# Patient Record
Sex: MALE | Race: WHITE | Employment: OTHER
[De-identification: names, ages, dates, MRNs, and addresses within clinical notes are randomized per-mention and may not be internally consistent; named-entity substitution may affect disease eponyms.]

---

## 2017-02-27 PROBLEM — E11.42 DIABETIC PERIPHERAL NEUROPATHY ASSOCIATED WITH TYPE 2 DIABETES MELLITUS (HCC): Status: ACTIVE | Noted: 2017-02-27

## 2017-02-27 PROBLEM — J30.1 SEASONAL ALLERGIC RHINITIS DUE TO POLLEN: Status: ACTIVE | Noted: 2017-02-27

## 2017-02-27 PROCEDURE — 82570 ASSAY OF URINE CREATININE: CPT | Performed by: INTERNAL MEDICINE

## 2017-02-27 PROCEDURE — 36415 COLL VENOUS BLD VENIPUNCTURE: CPT | Performed by: INTERNAL MEDICINE

## 2017-02-27 PROCEDURE — 81001 URINALYSIS AUTO W/SCOPE: CPT | Performed by: INTERNAL MEDICINE

## 2017-02-27 PROCEDURE — 82043 UR ALBUMIN QUANTITATIVE: CPT | Performed by: INTERNAL MEDICINE

## 2017-03-21 PROCEDURE — 81003 URINALYSIS AUTO W/O SCOPE: CPT | Performed by: INTERNAL MEDICINE

## 2017-03-21 PROCEDURE — 87086 URINE CULTURE/COLONY COUNT: CPT | Performed by: INTERNAL MEDICINE

## 2017-06-21 PROBLEM — R53.82 CHRONIC FATIGUE: Status: ACTIVE | Noted: 2017-06-21

## 2017-08-14 PROBLEM — G47.33 OSA (OBSTRUCTIVE SLEEP APNEA): Status: ACTIVE | Noted: 2017-08-14

## 2018-01-25 PROCEDURE — 81003 URINALYSIS AUTO W/O SCOPE: CPT | Performed by: INTERNAL MEDICINE

## 2018-02-01 PROBLEM — K25.0 BLEEDING ACUTE GASTRIC ULCER: Status: ACTIVE | Noted: 2018-02-01

## 2018-02-01 PROBLEM — Z98.890 S/P LAPAROTOMY: Status: ACTIVE | Noted: 2018-02-01

## 2018-02-08 ENCOUNTER — SURGERY (OUTPATIENT)
Age: 68
End: 2018-02-08

## 2018-02-08 ENCOUNTER — ANESTHESIA EVENT (OUTPATIENT)
Dept: SURGERY | Facility: HOSPITAL | Age: 68
End: 2018-02-08
Payer: MEDICARE

## 2018-02-08 ENCOUNTER — ANESTHESIA (OUTPATIENT)
Dept: SURGERY | Facility: HOSPITAL | Age: 68
End: 2018-02-08
Payer: MEDICARE

## 2018-02-08 ENCOUNTER — HOSPITAL ENCOUNTER (OUTPATIENT)
Facility: HOSPITAL | Age: 68
Setting detail: OBSERVATION
LOS: 1 days | Discharge: HOME HEALTH | End: 2018-02-09
Attending: SURGERY | Admitting: SURGERY
Payer: MEDICARE

## 2018-02-08 DIAGNOSIS — Z98.890 S/P LAPAROTOMY: ICD-10-CM

## 2018-02-08 DIAGNOSIS — T81.30XA WOUND DEHISCENCE: Primary | ICD-10-CM

## 2018-02-08 LAB
ALBUMIN SERPL-MCNC: 2.9 G/DL (ref 3.5–4.8)
ALP LIVER SERPL-CCNC: 81 U/L (ref 45–117)
ALT SERPL-CCNC: 15 U/L (ref 17–63)
AST SERPL-CCNC: 8 U/L (ref 15–41)
BASOPHILS # BLD AUTO: 0.1 X10(3) UL (ref 0–0.1)
BASOPHILS NFR BLD AUTO: 0.9 %
BILIRUB SERPL-MCNC: 0.3 MG/DL (ref 0.1–2)
BUN BLD-MCNC: 11 MG/DL (ref 8–20)
CALCIUM BLD-MCNC: 8.7 MG/DL (ref 8.3–10.3)
CHLORIDE: 107 MMOL/L (ref 101–111)
CO2: 27 MMOL/L (ref 22–32)
CREAT BLD-MCNC: 1 MG/DL (ref 0.7–1.3)
EOSINOPHIL # BLD AUTO: 0.04 X10(3) UL (ref 0–0.3)
EOSINOPHIL NFR BLD AUTO: 0.3 %
ERYTHROCYTE [DISTWIDTH] IN BLOOD BY AUTOMATED COUNT: 13.2 % (ref 11.5–16)
GLUCOSE BLD-MCNC: 138 MG/DL (ref 65–99)
GLUCOSE BLD-MCNC: 189 MG/DL (ref 70–99)
HCT VFR BLD AUTO: 45.3 % (ref 37–53)
HGB BLD-MCNC: 14.5 G/DL (ref 13–17)
IMMATURE GRANULOCYTE COUNT: 0.13 X10(3) UL (ref 0–1)
IMMATURE GRANULOCYTE RATIO %: 1.1 %
INR BLD: 1.04 (ref 0.89–1.11)
LYMPHOCYTES # BLD AUTO: 0.79 X10(3) UL (ref 0.9–4)
LYMPHOCYTES NFR BLD AUTO: 6.8 %
M PROTEIN MFR SERPL ELPH: 6.6 G/DL (ref 6.1–8.3)
MCH RBC QN AUTO: 29.7 PG (ref 27–33.2)
MCHC RBC AUTO-ENTMCNC: 32 G/DL (ref 31–37)
MCV RBC AUTO: 92.6 FL (ref 80–99)
MONOCYTES # BLD AUTO: 0.64 X10(3) UL (ref 0.1–1)
MONOCYTES NFR BLD AUTO: 5.5 %
NEUTROPHIL ABS PRELIM: 9.96 X10 (3) UL (ref 1.3–6.7)
NEUTROPHILS # BLD AUTO: 9.96 X10(3) UL (ref 1.3–6.7)
NEUTROPHILS NFR BLD AUTO: 85.4 %
PLATELET # BLD AUTO: 355 10(3)UL (ref 150–450)
POTASSIUM SERPL-SCNC: 4.2 MMOL/L (ref 3.6–5.1)
PSA SERPL DL<=0.01 NG/ML-MCNC: 13.6 SECONDS (ref 12–14.3)
RBC # BLD AUTO: 4.89 X10(6)UL (ref 3.8–5.8)
RED CELL DISTRIBUTION WIDTH-SD: 44.8 FL (ref 35.1–46.3)
SODIUM SERPL-SCNC: 140 MMOL/L (ref 136–144)
WBC # BLD AUTO: 11.7 X10(3) UL (ref 4–13)

## 2018-02-08 PROCEDURE — 85025 COMPLETE CBC W/AUTO DIFF WBC: CPT | Performed by: HOSPITALIST

## 2018-02-08 PROCEDURE — 94660 CPAP INITIATION&MGMT: CPT

## 2018-02-08 PROCEDURE — 0JQ80ZZ REPAIR ABDOMEN SUBCUTANEOUS TISSUE AND FASCIA, OPEN APPROACH: ICD-10-PCS | Performed by: SURGERY

## 2018-02-08 PROCEDURE — 82962 GLUCOSE BLOOD TEST: CPT

## 2018-02-08 PROCEDURE — 85610 PROTHROMBIN TIME: CPT | Performed by: HOSPITALIST

## 2018-02-08 PROCEDURE — 80053 COMPREHEN METABOLIC PANEL: CPT | Performed by: HOSPITALIST

## 2018-02-08 RX ORDER — HYDROMORPHONE HYDROCHLORIDE 1 MG/ML
0.4 INJECTION, SOLUTION INTRAMUSCULAR; INTRAVENOUS; SUBCUTANEOUS EVERY 2 HOUR PRN
Status: DISCONTINUED | OUTPATIENT
Start: 2018-02-08 | End: 2018-02-09

## 2018-02-08 RX ORDER — MYCOPHENOLATE MOFETIL 250 MG/1
500 CAPSULE ORAL 2 TIMES DAILY
Status: DISCONTINUED | OUTPATIENT
Start: 2018-02-09 | End: 2018-02-08

## 2018-02-08 RX ORDER — SUCRALFATE 1 G/1
1 TABLET ORAL
Status: DISCONTINUED | OUTPATIENT
Start: 2018-02-08 | End: 2018-02-09

## 2018-02-08 RX ORDER — PANTOPRAZOLE SODIUM 40 MG/1
40 TABLET, DELAYED RELEASE ORAL
Status: DISCONTINUED | OUTPATIENT
Start: 2018-02-09 | End: 2018-02-09

## 2018-02-08 RX ORDER — HYDROCODONE BITARTRATE AND ACETAMINOPHEN 5; 325 MG/1; MG/1
1 TABLET ORAL EVERY 6 HOURS PRN
Status: DISCONTINUED | OUTPATIENT
Start: 2018-02-08 | End: 2018-02-08

## 2018-02-08 RX ORDER — METOCLOPRAMIDE HYDROCHLORIDE 5 MG/ML
10 INJECTION INTRAMUSCULAR; INTRAVENOUS EVERY 6 HOURS PRN
Status: DISCONTINUED | OUTPATIENT
Start: 2018-02-08 | End: 2018-02-09

## 2018-02-08 RX ORDER — ATORVASTATIN CALCIUM 10 MG/1
10 TABLET, FILM COATED ORAL NIGHTLY
Status: DISCONTINUED | OUTPATIENT
Start: 2018-02-08 | End: 2018-02-09

## 2018-02-08 RX ORDER — HEPARIN SODIUM 5000 [USP'U]/ML
5000 INJECTION, SOLUTION INTRAVENOUS; SUBCUTANEOUS EVERY 12 HOURS SCHEDULED
Status: DISCONTINUED | OUTPATIENT
Start: 2018-02-09 | End: 2018-02-09

## 2018-02-08 RX ORDER — MEPERIDINE HYDROCHLORIDE 25 MG/ML
12.5 INJECTION INTRAMUSCULAR; INTRAVENOUS; SUBCUTANEOUS AS NEEDED
Status: DISCONTINUED | OUTPATIENT
Start: 2018-02-08 | End: 2018-02-08 | Stop reason: HOSPADM

## 2018-02-08 RX ORDER — MORPHINE SULFATE 2 MG/ML
2 INJECTION, SOLUTION INTRAMUSCULAR; INTRAVENOUS EVERY 2 HOUR PRN
Status: DISCONTINUED | OUTPATIENT
Start: 2018-02-08 | End: 2018-02-09

## 2018-02-08 RX ORDER — HYDROCODONE BITARTRATE AND ACETAMINOPHEN 5; 325 MG/1; MG/1
2 TABLET ORAL EVERY 4 HOURS PRN
Status: DISCONTINUED | OUTPATIENT
Start: 2018-02-08 | End: 2018-02-09

## 2018-02-08 RX ORDER — MORPHINE SULFATE 2 MG/ML
1 INJECTION, SOLUTION INTRAMUSCULAR; INTRAVENOUS EVERY 2 HOUR PRN
Status: DISCONTINUED | OUTPATIENT
Start: 2018-02-08 | End: 2018-02-09

## 2018-02-08 RX ORDER — SODIUM CHLORIDE, SODIUM LACTATE, POTASSIUM CHLORIDE, CALCIUM CHLORIDE 600; 310; 30; 20 MG/100ML; MG/100ML; MG/100ML; MG/100ML
INJECTION, SOLUTION INTRAVENOUS CONTINUOUS
Status: DISCONTINUED | OUTPATIENT
Start: 2018-02-08 | End: 2018-02-08 | Stop reason: HOSPADM

## 2018-02-08 RX ORDER — HYDROCODONE BITARTRATE AND ACETAMINOPHEN 5; 325 MG/1; MG/1
1 TABLET ORAL EVERY 4 HOURS PRN
Status: DISCONTINUED | OUTPATIENT
Start: 2018-02-08 | End: 2018-02-09

## 2018-02-08 RX ORDER — MORPHINE SULFATE 2 MG/ML
4 INJECTION, SOLUTION INTRAMUSCULAR; INTRAVENOUS EVERY 2 HOUR PRN
Status: DISCONTINUED | OUTPATIENT
Start: 2018-02-08 | End: 2018-02-09

## 2018-02-08 RX ORDER — SODIUM CHLORIDE 9 MG/ML
INJECTION, SOLUTION INTRAVENOUS CONTINUOUS
Status: DISCONTINUED | OUTPATIENT
Start: 2018-02-08 | End: 2018-02-09

## 2018-02-08 RX ORDER — HYDROMORPHONE HYDROCHLORIDE 1 MG/ML
1.2 INJECTION, SOLUTION INTRAMUSCULAR; INTRAVENOUS; SUBCUTANEOUS EVERY 2 HOUR PRN
Status: DISCONTINUED | OUTPATIENT
Start: 2018-02-08 | End: 2018-02-09

## 2018-02-08 RX ORDER — DEXTROSE MONOHYDRATE 25 G/50ML
50 INJECTION, SOLUTION INTRAVENOUS
Status: DISCONTINUED | OUTPATIENT
Start: 2018-02-08 | End: 2018-02-08 | Stop reason: HOSPADM

## 2018-02-08 RX ORDER — ACETAMINOPHEN 160 MG
2000 TABLET,DISINTEGRATING ORAL
Status: DISCONTINUED | OUTPATIENT
Start: 2018-02-09 | End: 2018-02-09

## 2018-02-08 RX ORDER — ONDANSETRON 2 MG/ML
4 INJECTION INTRAMUSCULAR; INTRAVENOUS AS NEEDED
Status: DISCONTINUED | OUTPATIENT
Start: 2018-02-08 | End: 2018-02-08 | Stop reason: HOSPADM

## 2018-02-08 RX ORDER — PYRIDOSTIGMINE BROMIDE 60 MG/1
60 TABLET ORAL DAILY
Status: DISCONTINUED | OUTPATIENT
Start: 2018-02-09 | End: 2018-02-09

## 2018-02-08 RX ORDER — MORPHINE SULFATE 2 MG/ML
6 INJECTION, SOLUTION INTRAMUSCULAR; INTRAVENOUS EVERY 2 HOUR PRN
Status: DISCONTINUED | OUTPATIENT
Start: 2018-02-08 | End: 2018-02-09

## 2018-02-08 RX ORDER — MYCOPHENOLATE MOFETIL 250 MG/1
500 CAPSULE ORAL 2 TIMES DAILY
Status: DISCONTINUED | OUTPATIENT
Start: 2018-02-08 | End: 2018-02-09

## 2018-02-08 RX ORDER — PYRIDOSTIGMINE BROMIDE 60 MG/1
120 TABLET ORAL 2 TIMES DAILY
Status: DISCONTINUED | OUTPATIENT
Start: 2018-02-08 | End: 2018-02-09

## 2018-02-08 RX ORDER — NALOXONE HYDROCHLORIDE 0.4 MG/ML
80 INJECTION, SOLUTION INTRAMUSCULAR; INTRAVENOUS; SUBCUTANEOUS AS NEEDED
Status: DISCONTINUED | OUTPATIENT
Start: 2018-02-08 | End: 2018-02-08 | Stop reason: HOSPADM

## 2018-02-08 RX ORDER — ONDANSETRON 2 MG/ML
4 INJECTION INTRAMUSCULAR; INTRAVENOUS EVERY 6 HOURS PRN
Status: DISCONTINUED | OUTPATIENT
Start: 2018-02-08 | End: 2018-02-08

## 2018-02-08 RX ORDER — DEXTROSE MONOHYDRATE 25 G/50ML
50 INJECTION, SOLUTION INTRAVENOUS
Status: DISCONTINUED | OUTPATIENT
Start: 2018-02-08 | End: 2018-02-09

## 2018-02-08 RX ORDER — METOCLOPRAMIDE HYDROCHLORIDE 5 MG/ML
10 INJECTION INTRAMUSCULAR; INTRAVENOUS AS NEEDED
Status: DISCONTINUED | OUTPATIENT
Start: 2018-02-08 | End: 2018-02-08 | Stop reason: HOSPADM

## 2018-02-08 RX ORDER — CEFOXITIN 1 G/1
INJECTION, POWDER, FOR SOLUTION INTRAVENOUS
Status: DISCONTINUED | OUTPATIENT
Start: 2018-02-08 | End: 2018-02-08 | Stop reason: HOSPADM

## 2018-02-08 RX ORDER — MORPHINE SULFATE 2 MG/ML
2 INJECTION, SOLUTION INTRAMUSCULAR; INTRAVENOUS EVERY 2 HOUR PRN
Status: DISCONTINUED | OUTPATIENT
Start: 2018-02-08 | End: 2018-02-08

## 2018-02-08 RX ORDER — HYDROMORPHONE HYDROCHLORIDE 1 MG/ML
0.8 INJECTION, SOLUTION INTRAMUSCULAR; INTRAVENOUS; SUBCUTANEOUS EVERY 2 HOUR PRN
Status: DISCONTINUED | OUTPATIENT
Start: 2018-02-08 | End: 2018-02-09

## 2018-02-08 RX ORDER — MIDAZOLAM HYDROCHLORIDE 1 MG/ML
1 INJECTION INTRAMUSCULAR; INTRAVENOUS EVERY 5 MIN PRN
Status: DISCONTINUED | OUTPATIENT
Start: 2018-02-08 | End: 2018-02-08 | Stop reason: HOSPADM

## 2018-02-08 RX ORDER — ONDANSETRON 2 MG/ML
4 INJECTION INTRAMUSCULAR; INTRAVENOUS EVERY 6 HOURS PRN
Status: DISCONTINUED | OUTPATIENT
Start: 2018-02-08 | End: 2018-02-09

## 2018-02-08 RX ORDER — MORPHINE SULFATE 2 MG/ML
4 INJECTION, SOLUTION INTRAMUSCULAR; INTRAVENOUS EVERY 2 HOUR PRN
Status: DISCONTINUED | OUTPATIENT
Start: 2018-02-08 | End: 2018-02-08

## 2018-02-08 RX ORDER — PYRIDOSTIGMINE BROMIDE 60 MG/1
60 TABLET ORAL SEE ADMIN INSTRUCTIONS
Status: DISCONTINUED | OUTPATIENT
Start: 2018-02-08 | End: 2018-02-08 | Stop reason: CLARIF

## 2018-02-08 NOTE — ANESTHESIA PREPROCEDURE EVALUATION
PRE-OP EVALUATION    Patient Name: Elise Beltrán    Pre-op Diagnosis: Dehiscence of closure of fascia, superficial or muscular [T81.32XA]    Procedure(s):  REPAIR OF A FASCIA  DEHISCENSE    Surgeon(s) and Role:     Aye Raza MD - Primary    Pre-op (+) sleep apnea       Neuro/Psych  Comment: Myasthenia gravis                                  Past Surgical History:  No date: COLONOSCOPY      Comment: 2007  9/15/2016: COLONOSCOPY N/A      Comment: Procedure: COLONOSCOPY;  Surgeon: Jose Manuel Johnson Scott Coffey MD;  Location: [address] [place] [address]  11[facility]21[facility]      Comment: Procedure: LUMBAR EPIDURAL;  Surgeon: Josselin Mccormick MD;  Location: [address] [place] [address]  No date: OTHER SURGICAL HISTORY

## 2018-02-09 VITALS
SYSTOLIC BLOOD PRESSURE: 115 MMHG | HEART RATE: 58 BPM | DIASTOLIC BLOOD PRESSURE: 74 MMHG | TEMPERATURE: 99 F | RESPIRATION RATE: 16 BRPM | OXYGEN SATURATION: 94 %

## 2018-02-09 LAB
ALBUMIN SERPL-MCNC: 2.5 G/DL (ref 3.5–4.8)
ALP LIVER SERPL-CCNC: 66 U/L (ref 45–117)
ALT SERPL-CCNC: 12 U/L (ref 17–63)
AST SERPL-CCNC: 7 U/L (ref 15–41)
BASOPHILS # BLD AUTO: 0.12 X10(3) UL (ref 0–0.1)
BASOPHILS NFR BLD AUTO: 1.2 %
BILIRUB SERPL-MCNC: 0.4 MG/DL (ref 0.1–2)
BUN BLD-MCNC: 10 MG/DL (ref 8–20)
CALCIUM BLD-MCNC: 8.4 MG/DL (ref 8.3–10.3)
CHLORIDE: 111 MMOL/L (ref 101–111)
CO2: 26 MMOL/L (ref 22–32)
CREAT BLD-MCNC: 0.84 MG/DL (ref 0.7–1.3)
EOSINOPHIL # BLD AUTO: 0.21 X10(3) UL (ref 0–0.3)
EOSINOPHIL NFR BLD AUTO: 2.1 %
ERYTHROCYTE [DISTWIDTH] IN BLOOD BY AUTOMATED COUNT: 13.2 % (ref 11.5–16)
EST. AVERAGE GLUCOSE BLD GHB EST-MCNC: 148 MG/DL (ref 68–126)
GLUCOSE BLD-MCNC: 91 MG/DL (ref 70–99)
GLUCOSE BLD-MCNC: 94 MG/DL (ref 65–99)
GLUCOSE BLD-MCNC: 99 MG/DL (ref 65–99)
HBA1C MFR BLD HPLC: 6.8 % (ref ?–5.7)
HCT VFR BLD AUTO: 42.5 % (ref 37–53)
HGB BLD-MCNC: 13.5 G/DL (ref 13–17)
IMMATURE GRANULOCYTE COUNT: 0.13 X10(3) UL (ref 0–1)
IMMATURE GRANULOCYTE RATIO %: 1.3 %
LYMPHOCYTES # BLD AUTO: 1.31 X10(3) UL (ref 0.9–4)
LYMPHOCYTES NFR BLD AUTO: 13.2 %
M PROTEIN MFR SERPL ELPH: 5.6 G/DL (ref 6.1–8.3)
MCH RBC QN AUTO: 29.7 PG (ref 27–33.2)
MCHC RBC AUTO-ENTMCNC: 31.8 G/DL (ref 31–37)
MCV RBC AUTO: 93.4 FL (ref 80–99)
MONOCYTES # BLD AUTO: 0.89 X10(3) UL (ref 0.1–1)
MONOCYTES NFR BLD AUTO: 8.9 %
NEUTROPHIL ABS PRELIM: 7.29 X10 (3) UL (ref 1.3–6.7)
NEUTROPHILS # BLD AUTO: 7.29 X10(3) UL (ref 1.3–6.7)
NEUTROPHILS NFR BLD AUTO: 73.3 %
PLATELET # BLD AUTO: 298 10(3)UL (ref 150–450)
POTASSIUM SERPL-SCNC: 3.8 MMOL/L (ref 3.6–5.1)
RBC # BLD AUTO: 4.55 X10(6)UL (ref 3.8–5.8)
RED CELL DISTRIBUTION WIDTH-SD: 45.1 FL (ref 35.1–46.3)
SODIUM SERPL-SCNC: 142 MMOL/L (ref 136–144)
WBC # BLD AUTO: 10 X10(3) UL (ref 4–13)

## 2018-02-09 PROCEDURE — 96372 THER/PROPH/DIAG INJ SC/IM: CPT

## 2018-02-09 PROCEDURE — 99211 OFF/OP EST MAY X REQ PHY/QHP: CPT

## 2018-02-09 PROCEDURE — 85025 COMPLETE CBC W/AUTO DIFF WBC: CPT | Performed by: HOSPITALIST

## 2018-02-09 PROCEDURE — 82962 GLUCOSE BLOOD TEST: CPT

## 2018-02-09 PROCEDURE — 83036 HEMOGLOBIN GLYCOSYLATED A1C: CPT | Performed by: HOSPITALIST

## 2018-02-09 PROCEDURE — 80053 COMPREHEN METABOLIC PANEL: CPT | Performed by: HOSPITALIST

## 2018-02-09 RX ORDER — HEPARIN SODIUM 5000 [USP'U]/ML
5000 INJECTION, SOLUTION INTRAVENOUS; SUBCUTANEOUS 2 TIMES DAILY
Status: DISCONTINUED | OUTPATIENT
Start: 2018-02-09 | End: 2018-02-09

## 2018-02-09 RX ORDER — POTASSIUM CHLORIDE 20 MEQ/1
40 TABLET, EXTENDED RELEASE ORAL ONCE
Status: COMPLETED | OUTPATIENT
Start: 2018-02-09 | End: 2018-02-09

## 2018-02-09 NOTE — OPERATIVE REPORT
[facility]    PATIENT'S NAME: Kami Hensley   ATTENDING PHYSICIAN: Scar Hammonds M.D. OPERATING PHYSICIAN: Chuyita Wiggins M.D.    PATIENT ACCOUNT#:   [de-identified]    LOCATION:  [address] [place] [address]  MEDICAL RECORD #:   [id number]       DATE OF BIRTH:  [birth year]

## 2018-02-09 NOTE — PROGRESS NOTES
Patient arrived from PACU in stable condition. Admission data completed. Patient resting comfortably in bed at this time. Family at bedside. Will continue to monitor.

## 2018-02-09 NOTE — ANESTHESIA POSTPROCEDURE EVALUATION
[address] Patient Status:  Inpatient   Age/Gender 69year old male MRN [id number]   [facility] SURGERY Attending Nayan Ray MD   Hosp Day # 0 PCP Neda Mckeon MD       Anesthesia Post-op Note    Procedure(s):

## 2018-02-09 NOTE — HOME CARE LIAISON
MET WITH PTNT TO DISCUSS HOME HEALTH SERVICES AND COVERAGE CRITERIA. PTNT AGREEABLE TO Emery Camejo. PTNT GIVEN RESIDENTIAL BROCHURE. RESIDENTIAL WITH PROVIDE SN/PT ON DISCHARGE.     Thank you for this referral,   Ashley Mccarthy

## 2018-02-09 NOTE — BRIEF OP NOTE
Pre-Operative Diagnosis: Dehiscence of closure of fascia, superficial or muscular [T81.32XA]     Post-Operative Diagnosis: Dehiscence of wound     Procedure Performed:   Procedure(s):  EXPLORATION OF WOUND    Surgeon(s) and Role:     Corinne Thurman MD -

## 2018-02-09 NOTE — WOUND PROGRESS NOTE
[facility]  Inpatient Wound Care Contact Note    Nguyễn Tyalor Patient Status:  Inpatient    1950 MRN [id number]   [facility] 3NW-A Attending Kami Siddiqi MD   Hosp Day # 1 PCP Jaden Yan MD     Wound Care service and

## 2018-02-09 NOTE — PLAN OF CARE
Problem: Patient/Family Goals  Goal: Patient/Family Short Term Goal  Patient's Short Term Goal: discharge    Interventions:   - dr duff here to see pt today.   Wound care team unavailable today d/t bad weather.    - See additional Care Plan goals for speci toileting schedule   Outcome: Progressing  Up ambulating in room with steady gait.   Has good safety awareness    Problem: SKIN/TISSUE INTEGRITY - ADULT  Goal: Incision(s), wounds(s) or drain site(s) healing without S/S of infection  INTERVENTIONS:  - Asses

## 2018-02-09 NOTE — CM/SW NOTE
Patient for discharge home today, wound care unable to eval for wound vac d/t weather. Will cont bid w to d dressings till seen in outpt wound clinic, phone number provided on avs. [facility] to [facility]; pending acceptance.  reviewed with patient a

## 2018-02-09 NOTE — H&P
MELISA Hospitalist H&P       CC: No chief complaint on file.        PCP: Coni German MD    History of Present Illness:  Mr. Ayden Castanon is a 70 yo male with PMH of myasthenia gravis, DM type 2 with history of surgery 3 weeks ago for a gastric ulcer who pres AHI 14 RDI 14 REM AHI 0 Supine AHI 44 non-supine AHI 1 Sao2 Zain 88% /APAP-6-16cwp/Prism   • Osteoarthrosis, unspecified whether generalized or localized, unspecified site    • Other and unspecified hyperlipidemia    • Type II or unspecified type diabetes EPIDURAL                STEROID INJECTION;  Surgeon: Donita Grigsby MD;  Location: [address] [place] [address]  No date: LAPAROSCOPY, SURGICAL PROSTATECTOMY, RETROPUBI*  10/24/2013: DANYA BY LONNY NUNN [facility] 5+ YR      Comment: Procedure: 10   VITAMIN D 2000 UNIT OR TABS 1 TABLET DAILY Disp:  Rfl:    ACCU-CHEK SOFT TOUCH LANCETS MISC tests once daily (Patient taking differently: tests once daily with 1000 IU of vitamin D) Disp: 3 months Rfl: 3   Calcium Carbonate (CALCIUM 600 OR) Take 1 tab 27.0  26.0   BUN  11  10   CREATSERUM  1.00  0.84   GLU  189*  91   CA  8.7  8.4       Recent Labs   Lab  02/08/18   2244  02/09/18   0537   ALT  15*  12*   AST  8*  7*   ALB  2.9*  2.5*       No results for input(s): TROP in the last 72 hours.     Radiolog

## 2018-02-09 NOTE — RESPIRATORY THERAPY NOTE
GURWINDER Equipment Usage Summary :            Set Mode :AUTO CPAP W FLEX          Usage in Hours:5;00          90% Pressure (EPAP) : 11.3           90% Insp Pressure (IPAP);           AHI : 11.4          Supplemental Oxygen :      LPM

## 2018-02-09 NOTE — PROGRESS NOTES
Spoke with dr duff. Informed md that wound care team not able to see pt today. md states to have pt do wet to dry dressing changes bid & f/u wound clinic Monday. md states ok to d/c home with hh.  Will page dr Erma Coronado to update on poc.     Dr Grisel Horvath

## 2018-02-09 NOTE — PROGRESS NOTES
Dr duff pageken to update on wound care team unable to see pt today d/t team is unavailable. Awaiting return call.

## 2018-02-09 NOTE — CM/SW NOTE
Patient failed inpatient criteria. Second level of review completed and supports observation.  UR committee in agreement.  UR ELLIOTT Daigle discussed with Dr. Sandra Brewer who approves observation status.  Observation order written, MOON letter given to the patient

## 2018-02-09 NOTE — PAYOR COMM NOTE
--------------  Order Requisition   Admit to inpatient Once (Order #[id number]) on 2/8/18        STATUS CHANGED TO OBS ON 2/9 AS A MEDICARE [id number]  Order Requisition   Place in observation Once (Order #[id number]) on 2/9/18        ADMISSION REVIEW     Payor: ILIANA Esophageal reflux    • Hiatal hernia    • High cholesterol    • Malignant neoplasm of prostate 1/14/2009    S/P robotic radical prostatectomy 11/07   • Myasthenia gravis  8/28/2010   • GURWINDER  3/5/17-PSG   • Osteoarthrosis    • Other and unspecified hyperlipi PROCEDURE PERFORMED:  Wound exploration under anesthesia.      The patient had an open wound in the mid abdomen from a previous midline surgical procedure 3-1/2 weeks ago.   This was evaluated in the office by my partner and determined to have a possible Given 120 mg Oral Nadeem Villalta RN    2/8/2018 2254 Given 120 mg Oral Kiana Mcdonald RN      0.9%  NaCl infusion     Date Action Dose Route User    2/9/2018 0923 New Bag (none) Intravenous [place]    2/8/2018 2040 Restarted (none) Intra

## 2018-02-09 NOTE — INTERVAL H&P NOTE
Pre-op Diagnosis: Dehiscence of closure of fascia, superficial or muscular [T81.32XA]    The above referenced H&P was reviewed by Ghassan Patterson MD on 2/8/2018, the patient was examined and no significant changes have occurred in the patient's condition since

## 2018-02-09 NOTE — DISCHARGE SUMMARY
General Medicine Discharge Summary     Patient ID:  Ramone Cao  69year old  5/19/1950    Admit date: 2/8/2018    Discharge date and time: 2/9/18    Attending Physician: Enio Vázquez MD     Primary Care Physician: Maria D Mcgee MD     Reason fo DAILY    HYDROcodone-acetaminophen 5-325 MG Oral Tab  Take 1 tablet by mouth every 6 (six) hours as needed for Pain.     prednisoLONE 5 MG Oral Tab  3 tabs daily    Pyridostigmine Bromide 60 MG Oral Tab  TAKE 2 TABLETS BY MOUTH EVERY MORNING AND 1 tab in af

## 2018-02-09 NOTE — H&P (VIEW-ONLY)
Ramone Cao is a 69year old male. Patient presents with:  New Patient: wound hasn't closed       Janice German    HPI:  Patient presents for an assessment of his abdominal wound.  He underwent emergency laparotomy 3-1/2 weeks ago in [place] or localized, unspecified site    • Other and unspecified hyperlipidemia    • Type II or unspecified type diabetes mellitus without mention of complication, not stated as uncontrolled     BORDERLINE   • Visual impairment     glasses   • Vitiligo 5/27/2010 SURGICAL PROSTATECTOMY, RETROPUBI*  10/24/2013: DANYA BY  [facility] 5+ YR      Comment: Procedure: LUMBAR EPIDURAL;  Surgeon: Chris Nicole MD;  Location: [address] [place] [address]  11/21/2013: DANYA BY  [facility] 5+ Y Tab Take 1 tablet (40 mg total) by mouth nightly. Disp: 90 tablet Rfl: 3   Wheat Dextrin (BENEFIBER DRINK MIX OR) Take by mouth as needed.    Disp:  Rfl:    ACCU-CHEK COMPACT In Vitro Strip CHECK FASTING BLOOD SUGAR EVERY DAY  Disp: 200 each Rfl: 10   VITAM synovitis upper or lower extremity, no spinal tenderness  EXTREMITIES: no cyanosis, clubbing or edema, peripheral pulses intact  NEUROLOGIC: intact; no sensorimotor deficit; reflexes normal  PSYCHIATRIC: alert and oriented x 3; affect appropriate      IMPR

## 2018-02-10 NOTE — PROGRESS NOTES
Pt d/c home. D/c instructions given to pt & pt's wife, including demonstration/instructions on dressing changes bid, diet, hydration, activity, home meds & f/u care.   Pt verbalized understanding of all instructions, including calling wound care clinic on

## 2018-02-13 ENCOUNTER — OFFICE VISIT (OUTPATIENT)
Dept: WOUND CARE | Facility: HOSPITAL | Age: 68
End: 2018-02-13
Attending: NURSE PRACTITIONER
Payer: MEDICARE

## 2018-02-13 DIAGNOSIS — E11.42 DIABETIC POLYNEUROPATHY (HCC): ICD-10-CM

## 2018-02-13 DIAGNOSIS — S31.102S UNSPECIFIED OPEN WOUND OF ABDOMINAL WALL, EPIGASTRIC REGION WITHOUT PENETRATION INTO PERITONEAL CAVITY, SEQUELA: Primary | ICD-10-CM

## 2018-02-13 DIAGNOSIS — T81.31XA DISRUPTION OF EXTERNAL OPERATION (SURGICAL) WOUND, NOT ELSEWHERE CLASSIFIED, INITIAL ENCOUNTER: ICD-10-CM

## 2018-02-13 PROCEDURE — 97597 DBRDMT OPN WND 1ST 20 CM/<: CPT

## 2018-02-13 PROCEDURE — 97607 NEG PRS WND THR NDME<=50SQCM: CPT

## 2018-02-13 PROCEDURE — 99215 OFFICE O/P EST HI 40 MIN: CPT

## 2018-02-14 NOTE — PROGRESS NOTES
Note Details  Patient Name: Ravi Ferrari Date: 2/13/2018   Patient Number: [id number] Physician / Sydni Matute: Bjorn Pérez   Patient YOB: 1950 Facility: [facility]    Chief Complaint  This information was obtained from t This information was obtained from the patient, chart  Patient has a medical history of:  visual impairment  Type II Diabetes  Hyperlipidemia  Osteoarthrosis  Sleep apnea  Myasthenia gravis (2010)  Prostate cancer  Onchomycosis  Diverticulitis  Hiatal Pablito pravastatin 40 mg tablet oral tablet oral  Carafate 1 gram tablet oral tablet oral  Mestinon 60 mg tablet oral tablet oral  pyridostigmine bromide 60 mg tablet oral tablet oral  prednisolone 5 mg tablet oral tablet oral  CellCept 500 mg tablet oral tablet Wound #1 Distal Abdomen is an acute Full Thickness Surgical Wound and has received a status of Not Healed. Subsequent wound encounter measurements are 3.8cm length x 2.9cm width x 0.6cm depth, with an area of 11.02 sq cm and a volume of 6.612 cubic cm.  No (Encounter Diagnosis) S31.102S - Unspecified open wound of abdominal wall, epigastric region without penetration into peritoneal cavity, sequela  (Encounter Diagnosis) T81.31XA - Disruption of external operation (surgical) wound, not elsewhere classified, Initial Anesthetic Order: Apply lidocaine to wound bed on all future wound center visits during preparation for physician exam if wound bed contains fibrin or eschar.   4% Topical Lidocaine  Dermaplast Spray    Wound Cleansing & Dressings  May shower with p

## 2018-02-16 ENCOUNTER — OFFICE VISIT (OUTPATIENT)
Dept: WOUND CARE | Facility: HOSPITAL | Age: 68
End: 2018-02-16
Attending: NURSE PRACTITIONER
Payer: MEDICARE

## 2018-02-16 DIAGNOSIS — T81.31XA DISRUPTION OF EXTERNAL OPERATION (SURGICAL) WOUND, NOT ELSEWHERE CLASSIFIED, INITIAL ENCOUNTER: ICD-10-CM

## 2018-02-16 DIAGNOSIS — S31.102S UNSPECIFIED OPEN WOUND OF ABDOMINAL WALL, EPIGASTRIC REGION WITHOUT PENETRATION INTO PERITONEAL CAVITY, SEQUELA: Primary | ICD-10-CM

## 2018-02-16 PROCEDURE — 97607 NEG PRS WND THR NDME<=50SQCM: CPT

## 2018-02-20 ENCOUNTER — OFFICE VISIT (OUTPATIENT)
Dept: WOUND CARE | Facility: HOSPITAL | Age: 68
End: 2018-02-20
Attending: NURSE PRACTITIONER
Payer: MEDICARE

## 2018-02-20 DIAGNOSIS — S31.102S UNSPECIFIED OPEN WOUND OF ABDOMINAL WALL, EPIGASTRIC REGION WITHOUT PENETRATION INTO PERITONEAL CAVITY, SEQUELA: Primary | ICD-10-CM

## 2018-02-20 DIAGNOSIS — T81.31XA DISRUPTION OF EXTERNAL OPERATION (SURGICAL) WOUND, NOT ELSEWHERE CLASSIFIED, INITIAL ENCOUNTER: ICD-10-CM

## 2018-02-20 DIAGNOSIS — E11.42 DIABETIC POLYNEUROPATHY (HCC): ICD-10-CM

## 2018-02-20 PROCEDURE — 11042 DBRDMT SUBQ TIS 1ST 20SQCM/<: CPT

## 2018-02-21 ENCOUNTER — OFFICE VISIT (OUTPATIENT)
Dept: WOUND CARE | Facility: HOSPITAL | Age: 68
End: 2018-02-21
Attending: NURSE PRACTITIONER
Payer: MEDICARE

## 2018-02-21 DIAGNOSIS — T81.31XA DISRUPTION OF EXTERNAL OPERATION (SURGICAL) WOUND, NOT ELSEWHERE CLASSIFIED, INITIAL ENCOUNTER: ICD-10-CM

## 2018-02-21 DIAGNOSIS — S31.102S UNSPECIFIED OPEN WOUND OF ABDOMINAL WALL, EPIGASTRIC REGION WITHOUT PENETRATION INTO PERITONEAL CAVITY, SEQUELA: Primary | ICD-10-CM

## 2018-02-21 PROCEDURE — 99213 OFFICE O/P EST LOW 20 MIN: CPT

## 2018-02-23 ENCOUNTER — APPOINTMENT (OUTPATIENT)
Dept: WOUND CARE | Facility: HOSPITAL | Age: 68
End: 2018-02-23
Attending: NURSE PRACTITIONER
Payer: MEDICARE

## 2018-02-27 ENCOUNTER — OFFICE VISIT (OUTPATIENT)
Dept: WOUND CARE | Facility: HOSPITAL | Age: 68
End: 2018-02-27
Attending: NURSE PRACTITIONER
Payer: MEDICARE

## 2018-02-27 DIAGNOSIS — S31.102S UNSPECIFIED OPEN WOUND OF ABDOMINAL WALL, EPIGASTRIC REGION WITHOUT PENETRATION INTO PERITONEAL CAVITY, SEQUELA: Primary | ICD-10-CM

## 2018-02-27 DIAGNOSIS — E11.42 DIABETIC POLYNEUROPATHY (HCC): ICD-10-CM

## 2018-02-27 DIAGNOSIS — T81.31XA DISRUPTION OF EXTERNAL OPERATION (SURGICAL) WOUND, NOT ELSEWHERE CLASSIFIED, INITIAL ENCOUNTER: ICD-10-CM

## 2018-02-27 PROCEDURE — 97597 DBRDMT OPN WND 1ST 20 CM/<: CPT

## 2018-02-27 NOTE — PROGRESS NOTES
Progress Note Details  Patient Name: John Sensing Date: 2/27/2018   Patient Number: [id number] Physician / Jad Langley: Harsh Lamb   Patient YOB: 1950 Facility: [facility]    Chief Complaint  This information was obtain 2/27/18-pt return for abdominal wound management. Able to do dressing changes at home. Received Bryanna Ag. Afebrile and denies any pain.     Complaints and Symptoms  This information was obtained from the patient  Patient complains of:   Respiratory: Other Normal respiratory effort ,without use of accessory muscles. Clear bilaterally, free of adventitious sounds. .     Cardiovascular:  RRR, S1, S2.     Gastrointestinal (GI):  Abdominal wound. Psychiatric:  Intact judgement and insight. Approptiate affect. Wound #1 (Surgical Wound) is located on the distal abdomen. A selective debridement with a total area debrided of 0.69 sq cm was performed by Gaurav Blancas NP. to remove devitalized tissue: biofilm and slough.  The following instrument(s) were used: c Wound type: - Surgical  Wound improving. No s/s of infection.    Last sharp debridement date: - 2/27/18  Last A1C date and value: - 2/9/18 6.8  Last albumin date and value: - 2/9/18-2.5 and 5.6-Protein supplements suggested and sample given    Additional Or

## 2018-03-06 ENCOUNTER — OFFICE VISIT (OUTPATIENT)
Dept: WOUND CARE | Facility: HOSPITAL | Age: 68
End: 2018-03-06
Attending: NURSE PRACTITIONER
Payer: MEDICARE

## 2018-03-06 DIAGNOSIS — T81.31XA DISRUPTION OF EXTERNAL OPERATION (SURGICAL) WOUND, NOT ELSEWHERE CLASSIFIED, INITIAL ENCOUNTER: ICD-10-CM

## 2018-03-06 DIAGNOSIS — E11.42 DIABETIC POLYNEUROPATHY (HCC): ICD-10-CM

## 2018-03-06 DIAGNOSIS — S31.102S UNSPECIFIED OPEN WOUND OF ABDOMINAL WALL, EPIGASTRIC REGION WITHOUT PENETRATION INTO PERITONEAL CAVITY, SEQUELA: Primary | ICD-10-CM

## 2018-03-06 PROCEDURE — 17250 CHEM CAUT OF GRANLTJ TISSUE: CPT

## 2018-03-06 NOTE — PROGRESS NOTES
Progress Note Details  Patient Name: David Cullen Date: 3/6/2018   Patient Number: [id number] Physician / Shane Duenas: Marcela Keita   Patient YOB: 1950 Facility: [facility]    Chief Complaint  This information was obtaine 3/6/18-Pt here for abdominal wound management. Spouse able to do dressing changes as per treatment plan. Afebrile. No pain in wound area.     Complaints and Symptoms  This information was obtained from the patient  Patient complains of:   Respiratory: Other Normal respiratory effort ,without use of accessory muscles. Clear bilaterally, free of adventitious sounds. .     Cardiovascular:  RRR, S1, S2, No murmur, rubs or gallops. .     Gastrointestinal (GI):  Abdominal wound.      Psychiatric:  Intact judgement and Wound Orders:  Wound #1 Distal Abdomen     Topicals:  Initial Anesthetic Order: Apply lidocaine to wound bed on all future wound center visits during preparation for physician exam if wound bed contains fibrin or eschar.   Silver Nitrate  4% Topical Lidocai Entered By: Lidia Richardson on 03/06/2018 17:18:48

## 2018-03-08 PROBLEM — I51.89 LEFT VENTRICULAR DIASTOLIC DYSFUNCTION WITH PRESERVED SYSTOLIC FUNCTION: Status: ACTIVE | Noted: 2018-03-08

## 2018-03-08 PROBLEM — I70.0 AORTIC ATHEROSCLEROSIS (HCC): Status: ACTIVE | Noted: 2018-03-08

## 2018-03-08 PROBLEM — E11.42 DIABETIC PERIPHERAL NEUROPATHY ASSOCIATED WITH TYPE 2 DIABETES MELLITUS (HCC): Status: RESOLVED | Noted: 2017-02-27 | Resolved: 2018-03-08

## 2018-03-09 PROBLEM — I82.401 ACUTE DEEP VEIN THROMBOSIS (DVT) OF RIGHT LOWER EXTREMITY, UNSPECIFIED VEIN (HCC): Status: ACTIVE | Noted: 2018-03-09

## 2018-03-09 PROBLEM — K43.9 VENTRAL HERNIA WITHOUT OBSTRUCTION OR GANGRENE: Status: ACTIVE | Noted: 2018-03-09

## 2018-03-13 ENCOUNTER — OFFICE VISIT (OUTPATIENT)
Dept: WOUND CARE | Facility: HOSPITAL | Age: 68
End: 2018-03-13
Attending: NURSE PRACTITIONER
Payer: MEDICARE

## 2018-03-13 DIAGNOSIS — S31.102S UNSPECIFIED OPEN WOUND OF ABDOMINAL WALL, EPIGASTRIC REGION WITHOUT PENETRATION INTO PERITONEAL CAVITY, SEQUELA: Primary | ICD-10-CM

## 2018-03-13 DIAGNOSIS — E11.42 DIABETIC POLYNEUROPATHY (HCC): ICD-10-CM

## 2018-03-13 DIAGNOSIS — T81.31XA DISRUPTION OF EXTERNAL OPERATION (SURGICAL) WOUND, NOT ELSEWHERE CLASSIFIED, INITIAL ENCOUNTER: ICD-10-CM

## 2018-03-13 PROCEDURE — 99213 OFFICE O/P EST LOW 20 MIN: CPT

## 2018-03-13 PROCEDURE — 88342 IMHCHEM/IMCYTCHM 1ST ANTB: CPT | Performed by: INTERNAL MEDICINE

## 2018-03-13 PROCEDURE — 88305 TISSUE EXAM BY PATHOLOGIST: CPT | Performed by: INTERNAL MEDICINE

## 2018-03-14 NOTE — PROGRESS NOTES
Progress Note Details  Patient Name: Johnathan Moses Date: 3/13/2018   Patient Number: [id number] Physician / Cha Course: Enrique Mckinney   Patient YOB: 1950 Facility: [facility]    Chief Complaint  This information was obtain 3/6/18-Pt here for abdominal wound management. Spouse able to do dressing changes as per treatment plan. Afebrile. No pain in wound area. 3/13/18-Pt here for abdominal wound management. Pt stated no drainage noted.  Afebrile and denies any pain,    Complai BP WNL. Pulse RRR. RR within normal limits. Afebrile. Within Ideal body weight range. Alert, calm, well developed, in no apparent distress.  Height/Length: 78 in (198.12 cm), Weight: 209 lbs (95 kgs), BMI: 24.1, Temperature: 95.5 °F (35.28 °C), Pulse: 54 bp Follow-Up Appointments  Discharge from Outpatient Services. - Bordered foam x 1 week        Wound healed. Scab noted. Bordered foam for 1 week until scab fall. D/c'd from outpatient wound care services.   Electronic Signature(s)   Signed By:  Date:    Laurie Tarango

## 2018-04-06 PROCEDURE — 81003 URINALYSIS AUTO W/O SCOPE: CPT | Performed by: INTERNAL MEDICINE

## 2018-04-09 PROBLEM — K25.4 GASTRIC ULCER WITH HEMORRHAGE, UNSPECIFIED CHRONICITY: Status: ACTIVE | Noted: 2018-04-09

## 2018-04-09 PROBLEM — M48.061 SPINAL STENOSIS OF LUMBAR REGION WITHOUT NEUROGENIC CLAUDICATION: Status: ACTIVE | Noted: 2018-04-09

## 2018-04-09 PROBLEM — T81.30XA WOUND DEHISCENCE: Status: RESOLVED | Noted: 2018-02-08 | Resolved: 2018-04-09

## 2018-04-09 PROBLEM — K25.0 BLEEDING ACUTE GASTRIC ULCER: Status: RESOLVED | Noted: 2018-02-01 | Resolved: 2018-04-09

## 2018-06-07 PROCEDURE — 88305 TISSUE EXAM BY PATHOLOGIST: CPT | Performed by: INTERNAL MEDICINE

## 2018-06-07 PROCEDURE — 88104 CYTOPATH FL NONGYN SMEARS: CPT | Performed by: INTERNAL MEDICINE

## 2018-06-07 PROCEDURE — 88108 CYTOPATH CONCENTRATE TECH: CPT | Performed by: INTERNAL MEDICINE

## 2018-06-08 PROBLEM — R29.898 DEFICIENCIES OF LIMBS: Status: ACTIVE | Noted: 2018-06-08

## 2018-06-12 PROBLEM — R53.81 PHYSICAL DECONDITIONING: Status: ACTIVE | Noted: 2018-06-12

## 2018-06-12 PROBLEM — R53.1 WEAKNESS: Status: ACTIVE | Noted: 2017-06-21

## 2018-08-20 PROBLEM — R60.0 BILATERAL LEG EDEMA: Status: ACTIVE | Noted: 2018-08-20

## 2019-01-07 RX ORDER — ACETAMINOPHEN 325 MG/1
325 TABLET ORAL EVERY 6 HOURS PRN
COMMUNITY
End: 2019-04-26 | Stop reason: ALTCHOICE

## 2019-01-07 RX ORDER — BUPRENORPHINE HCL 8 MG/1
1 TABLET SUBLINGUAL DAILY
COMMUNITY

## 2019-01-20 ENCOUNTER — ANESTHESIA EVENT (OUTPATIENT)
Dept: SURGERY | Facility: HOSPITAL | Age: 69
DRG: 354 | End: 2019-01-20
Payer: MEDICARE

## 2019-01-21 ENCOUNTER — HOSPITAL ENCOUNTER (INPATIENT)
Facility: HOSPITAL | Age: 69
LOS: 2 days | Discharge: HOME | DRG: 354 | End: 2019-01-23
Attending: SURGERY | Admitting: SURGERY
Payer: MEDICARE

## 2019-01-21 ENCOUNTER — ANESTHESIA (OUTPATIENT)
Dept: SURGERY | Facility: HOSPITAL | Age: 69
DRG: 354 | End: 2019-01-21
Payer: MEDICARE

## 2019-01-21 DIAGNOSIS — K43.9 VENTRAL HERNIA WITHOUT OBSTRUCTION OR GANGRENE: Primary | ICD-10-CM

## 2019-01-21 LAB
GLUCOSE BLD-MCNC: 134 MG/DL (ref 65–99)
GLUCOSE BLD-MCNC: 186 MG/DL (ref 65–99)
GLUCOSE BLD-MCNC: 90 MG/DL (ref 65–99)

## 2019-01-21 PROCEDURE — 82962 GLUCOSE BLOOD TEST: CPT

## 2019-01-21 PROCEDURE — 94660 CPAP INITIATION&MGMT: CPT

## 2019-01-21 PROCEDURE — 0WUF0JZ SUPPLEMENT ABDOMINAL WALL WITH SYNTHETIC SUBSTITUTE, OPEN APPROACH: ICD-10-PCS | Performed by: SURGERY

## 2019-01-21 DEVICE — BARD SOFT MESH
Type: IMPLANTABLE DEVICE | Site: ABDOMEN | Status: FUNCTIONAL
Brand: BARD SOFT MESH

## 2019-01-21 RX ORDER — ONDANSETRON 2 MG/ML
4 INJECTION INTRAMUSCULAR; INTRAVENOUS EVERY 4 HOURS PRN
Status: DISCONTINUED | OUTPATIENT
Start: 2019-01-21 | End: 2019-01-23

## 2019-01-21 RX ORDER — KETOROLAC TROMETHAMINE 15 MG/ML
15 INJECTION, SOLUTION INTRAMUSCULAR; INTRAVENOUS EVERY 6 HOURS PRN
Status: DISCONTINUED | OUTPATIENT
Start: 2019-01-22 | End: 2019-01-22

## 2019-01-21 RX ORDER — PREDNISONE 1 MG/1
5 TABLET ORAL
Status: DISCONTINUED | OUTPATIENT
Start: 2019-01-21 | End: 2019-01-22

## 2019-01-21 RX ORDER — SODIUM CHLORIDE, SODIUM LACTATE, POTASSIUM CHLORIDE, CALCIUM CHLORIDE 600; 310; 30; 20 MG/100ML; MG/100ML; MG/100ML; MG/100ML
1.5 INJECTION, SOLUTION INTRAVENOUS CONTINUOUS
Status: DISCONTINUED | OUTPATIENT
Start: 2019-01-21 | End: 2019-01-23

## 2019-01-21 RX ORDER — PANTOPRAZOLE SODIUM 40 MG/1
40 TABLET, DELAYED RELEASE ORAL
COMMUNITY
End: 2019-12-26

## 2019-01-21 RX ORDER — HYDROMORPHONE HYDROCHLORIDE 1 MG/ML
0.2 INJECTION, SOLUTION INTRAMUSCULAR; INTRAVENOUS; SUBCUTANEOUS EVERY 2 HOUR PRN
Status: DISCONTINUED | OUTPATIENT
Start: 2019-01-21 | End: 2019-01-23

## 2019-01-21 RX ORDER — MEPERIDINE HYDROCHLORIDE 25 MG/ML
12.5 INJECTION INTRAMUSCULAR; INTRAVENOUS; SUBCUTANEOUS AS NEEDED
Status: DISCONTINUED | OUTPATIENT
Start: 2019-01-21 | End: 2019-01-21 | Stop reason: HOSPADM

## 2019-01-21 RX ORDER — FAMOTIDINE 10 MG/ML
20 INJECTION, SOLUTION INTRAVENOUS 2 TIMES DAILY
Status: DISCONTINUED | OUTPATIENT
Start: 2019-01-21 | End: 2019-01-22

## 2019-01-21 RX ORDER — PYRIDOSTIGMINE BROMIDE 60 MG/1
TABLET ORAL SEE ADMIN INSTRUCTIONS
Status: ON HOLD | COMMUNITY
End: 2019-01-23

## 2019-01-21 RX ORDER — GABAPENTIN 100 MG/1
200 CAPSULE ORAL NIGHTLY
Status: DISCONTINUED | OUTPATIENT
Start: 2019-01-21 | End: 2019-01-23

## 2019-01-21 RX ORDER — SODIUM CHLORIDE, SODIUM LACTATE, POTASSIUM CHLORIDE, CALCIUM CHLORIDE 600; 310; 30; 20 MG/100ML; MG/100ML; MG/100ML; MG/100ML
INJECTION, SOLUTION INTRAVENOUS CONTINUOUS
Status: DISCONTINUED | OUTPATIENT
Start: 2019-01-21 | End: 2019-01-21 | Stop reason: HOSPADM

## 2019-01-21 RX ORDER — OXYCODONE HYDROCHLORIDE 15 MG/1
15 TABLET ORAL EVERY 4 HOURS PRN
Status: DISCONTINUED | OUTPATIENT
Start: 2019-01-21 | End: 2019-01-23

## 2019-01-21 RX ORDER — MEPERIDINE HYDROCHLORIDE 25 MG/ML
INJECTION INTRAMUSCULAR; INTRAVENOUS; SUBCUTANEOUS
Status: COMPLETED
Start: 2019-01-21 | End: 2019-01-21

## 2019-01-21 RX ORDER — KETOROLAC TROMETHAMINE 15 MG/ML
15 INJECTION, SOLUTION INTRAMUSCULAR; INTRAVENOUS EVERY 6 HOURS
Status: DISCONTINUED | OUTPATIENT
Start: 2019-01-21 | End: 2019-01-22

## 2019-01-21 RX ORDER — PYRIDOSTIGMINE BROMIDE 60 MG/1
120 TABLET ORAL EVERY 8 HOURS SCHEDULED
Status: DISCONTINUED | OUTPATIENT
Start: 2019-01-21 | End: 2019-01-22

## 2019-01-21 RX ORDER — INSULIN ASPART 100 [IU]/ML
INJECTION, SOLUTION INTRAVENOUS; SUBCUTANEOUS ONCE
Status: COMPLETED | OUTPATIENT
Start: 2019-01-21 | End: 2019-01-21

## 2019-01-21 RX ORDER — DEXTROSE MONOHYDRATE 25 G/50ML
50 INJECTION, SOLUTION INTRAVENOUS
Status: DISCONTINUED | OUTPATIENT
Start: 2019-01-21 | End: 2019-01-21 | Stop reason: HOSPADM

## 2019-01-21 RX ORDER — ACETAMINOPHEN 500 MG
1000 TABLET ORAL ONCE
Status: DISCONTINUED | OUTPATIENT
Start: 2019-01-21 | End: 2019-01-21 | Stop reason: HOSPADM

## 2019-01-21 RX ORDER — HYDROCODONE BITARTRATE AND ACETAMINOPHEN 5; 325 MG/1; MG/1
1 TABLET ORAL EVERY 4 HOURS PRN
Status: DISCONTINUED | OUTPATIENT
Start: 2019-01-21 | End: 2019-01-23

## 2019-01-21 RX ORDER — ONDANSETRON 2 MG/ML
4 INJECTION INTRAMUSCULAR; INTRAVENOUS AS NEEDED
Status: DISCONTINUED | OUTPATIENT
Start: 2019-01-21 | End: 2019-01-21 | Stop reason: HOSPADM

## 2019-01-21 RX ORDER — NALOXONE HYDROCHLORIDE 0.4 MG/ML
0.08 INJECTION, SOLUTION INTRAMUSCULAR; INTRAVENOUS; SUBCUTANEOUS
Status: DISCONTINUED | OUTPATIENT
Start: 2019-01-21 | End: 2019-01-23

## 2019-01-21 RX ORDER — PYRIDOSTIGMINE BROMIDE 60 MG/1
60 TABLET ORAL 3 TIMES DAILY
Status: ON HOLD | COMMUNITY
End: 2019-01-23

## 2019-01-21 RX ORDER — MYCOPHENOLATE MOFETIL 500 MG/1
500 TABLET ORAL 2 TIMES DAILY
COMMUNITY
End: 2019-11-15

## 2019-01-21 RX ORDER — DOCUSATE SODIUM 100 MG/1
100 CAPSULE, LIQUID FILLED ORAL 2 TIMES DAILY
Status: DISCONTINUED | OUTPATIENT
Start: 2019-01-21 | End: 2019-01-23

## 2019-01-21 RX ORDER — FAMOTIDINE 20 MG/1
20 TABLET ORAL 2 TIMES DAILY
Status: DISCONTINUED | OUTPATIENT
Start: 2019-01-21 | End: 2019-01-22

## 2019-01-21 RX ORDER — HYDROMORPHONE HYDROCHLORIDE 1 MG/ML
0.4 INJECTION, SOLUTION INTRAMUSCULAR; INTRAVENOUS; SUBCUTANEOUS EVERY 2 HOUR PRN
Status: DISCONTINUED | OUTPATIENT
Start: 2019-01-21 | End: 2019-01-23

## 2019-01-21 RX ORDER — PYRIDOSTIGMINE BROMIDE 60 MG/1
60 TABLET ORAL SEE ADMIN INSTRUCTIONS
Status: DISCONTINUED | OUTPATIENT
Start: 2019-01-21 | End: 2019-01-21

## 2019-01-21 RX ORDER — POLYMYXIN B 500000 [USP'U]/1
INJECTION, POWDER, LYOPHILIZED, FOR SOLUTION INTRAMUSCULAR; INTRATHECAL; INTRAVENOUS; OPHTHALMIC AS NEEDED
Status: DISCONTINUED | OUTPATIENT
Start: 2019-01-21 | End: 2019-01-21

## 2019-01-21 RX ORDER — NALBUPHINE HCL 10 MG/ML
2.5 AMPUL (ML) INJECTION EVERY 4 HOURS PRN
Status: DISCONTINUED | OUTPATIENT
Start: 2019-01-21 | End: 2019-01-23

## 2019-01-21 RX ORDER — ACETAMINOPHEN 500 MG
1000 TABLET ORAL EVERY 8 HOURS
Status: DISCONTINUED | OUTPATIENT
Start: 2019-01-21 | End: 2019-01-23

## 2019-01-21 RX ORDER — PYRIDOSTIGMINE BROMIDE 60 MG/1
120 TABLET ORAL 2 TIMES DAILY
Status: DISCONTINUED | OUTPATIENT
Start: 2019-01-21 | End: 2019-01-21

## 2019-01-21 RX ORDER — HYDROCODONE BITARTRATE AND ACETAMINOPHEN 5; 325 MG/1; MG/1
2 TABLET ORAL EVERY 4 HOURS PRN
Status: DISCONTINUED | OUTPATIENT
Start: 2019-01-21 | End: 2019-01-23

## 2019-01-21 RX ORDER — NALOXONE HYDROCHLORIDE 0.4 MG/ML
80 INJECTION, SOLUTION INTRAMUSCULAR; INTRAVENOUS; SUBCUTANEOUS AS NEEDED
Status: DISCONTINUED | OUTPATIENT
Start: 2019-01-21 | End: 2019-01-21 | Stop reason: HOSPADM

## 2019-01-21 RX ORDER — MULTIVIT-MIN/IRON/FOLIC ACID/K 18-600-40
2000 CAPSULE ORAL DAILY
COMMUNITY
End: 2020-01-06

## 2019-01-21 RX ORDER — HYDROMORPHONE HYDROCHLORIDE 1 MG/ML
0.8 INJECTION, SOLUTION INTRAMUSCULAR; INTRAVENOUS; SUBCUTANEOUS EVERY 2 HOUR PRN
Status: DISCONTINUED | OUTPATIENT
Start: 2019-01-21 | End: 2019-01-23

## 2019-01-21 RX ORDER — OXYCODONE HYDROCHLORIDE 5 MG/1
5 TABLET ORAL EVERY 4 HOURS PRN
Status: DISCONTINUED | OUTPATIENT
Start: 2019-01-21 | End: 2019-01-23

## 2019-01-21 RX ORDER — PREDNISONE 1 MG/1
5 TABLET ORAL 3 TIMES DAILY
Status: ON HOLD | COMMUNITY
End: 2019-01-23

## 2019-01-21 RX ORDER — MYCOPHENOLATE MOFETIL 250 MG/1
500 CAPSULE ORAL 2 TIMES DAILY
Status: DISCONTINUED | OUTPATIENT
Start: 2019-01-21 | End: 2019-01-23

## 2019-01-21 RX ORDER — OXYCODONE HYDROCHLORIDE 5 MG/1
10 TABLET ORAL EVERY 4 HOURS PRN
Status: DISCONTINUED | OUTPATIENT
Start: 2019-01-21 | End: 2019-01-23

## 2019-01-21 RX ORDER — CEFAZOLIN SODIUM/WATER 2 G/20 ML
2 SYRINGE (ML) INTRAVENOUS EVERY 8 HOURS
Status: DISCONTINUED | OUTPATIENT
Start: 2019-01-21 | End: 2019-01-23

## 2019-01-21 RX ORDER — DIPHENHYDRAMINE HYDROCHLORIDE 50 MG/ML
12.5 INJECTION INTRAMUSCULAR; INTRAVENOUS EVERY 4 HOURS PRN
Status: DISCONTINUED | OUTPATIENT
Start: 2019-01-21 | End: 2019-01-23

## 2019-01-21 RX ORDER — ONDANSETRON 2 MG/ML
4 INJECTION INTRAMUSCULAR; INTRAVENOUS EVERY 6 HOURS PRN
Status: DISCONTINUED | OUTPATIENT
Start: 2019-01-21 | End: 2019-01-21

## 2019-01-21 RX ORDER — BACITRACIN 50000 [USP'U]/1
INJECTION, POWDER, LYOPHILIZED, FOR SOLUTION INTRAMUSCULAR AS NEEDED
Status: DISCONTINUED | OUTPATIENT
Start: 2019-01-21 | End: 2019-01-21

## 2019-01-21 RX ORDER — HEPARIN SODIUM 5000 [USP'U]/ML
5000 INJECTION, SOLUTION INTRAVENOUS; SUBCUTANEOUS EVERY 8 HOURS SCHEDULED
Status: DISCONTINUED | OUTPATIENT
Start: 2019-01-22 | End: 2019-01-23

## 2019-01-21 RX ORDER — METOCLOPRAMIDE HYDROCHLORIDE 5 MG/ML
10 INJECTION INTRAMUSCULAR; INTRAVENOUS AS NEEDED
Status: DISCONTINUED | OUTPATIENT
Start: 2019-01-21 | End: 2019-01-21 | Stop reason: HOSPADM

## 2019-01-21 RX ORDER — INSULIN ASPART 100 [IU]/ML
INJECTION, SOLUTION INTRAVENOUS; SUBCUTANEOUS
Status: COMPLETED
Start: 2019-01-21 | End: 2019-01-21

## 2019-01-21 RX ORDER — CEFAZOLIN SODIUM/WATER 2 G/20 ML
2 SYRINGE (ML) INTRAVENOUS ONCE
Status: COMPLETED | OUTPATIENT
Start: 2019-01-21 | End: 2019-01-21

## 2019-01-21 RX ORDER — HYDROMORPHONE HYDROCHLORIDE 1 MG/ML
INJECTION, SOLUTION INTRAMUSCULAR; INTRAVENOUS; SUBCUTANEOUS
Status: COMPLETED
Start: 2019-01-21 | End: 2019-01-21

## 2019-01-21 RX ORDER — HYDROMORPHONE HYDROCHLORIDE 1 MG/ML
0.4 INJECTION, SOLUTION INTRAMUSCULAR; INTRAVENOUS; SUBCUTANEOUS EVERY 5 MIN PRN
Status: DISCONTINUED | OUTPATIENT
Start: 2019-01-21 | End: 2019-01-21 | Stop reason: HOSPADM

## 2019-01-21 NOTE — CONSULTS
[facility] Pharmacy Note:  Pain Consult    Radha John is a 77year old male started on Dilaudid PCA by Dr. Sandro Lopez. Pharmacy was consulted to review medication profile and to discontinue previously ordered narcotics and sedatives.     Medication profile wa

## 2019-01-21 NOTE — PLAN OF CARE
NURSING ADMISSION NOTE      Patient admitted via bed, from PACU. Oriented to room. Safety precautions initiated. Bed in low position. Call light in reach. Pt alert and oriented, instructed on use of PCA pump. Family at bedside.   Admission intervi

## 2019-01-21 NOTE — PROGRESS NOTES
[facility] Pharmacy Note:  Age Based Dose Adjustment    Grecia Gilmoregracejose has been prescribed ketorolac (TORADOL) 30 mg IV every 6 hours x 4 doses. Patient is >71 years old therefore the dose has been adjusted to 15 mg IV every 6 hours x 4 doses.       Thank you,

## 2019-01-21 NOTE — OPERATIVE REPORT
[facility]    PATIENT'S NAME: Kami Hensley   ATTENDING PHYSICIAN: Chuyita Wiggins M.D. OPERATING PHYSICIAN: Chuyita Wiggins M.D.    PATIENT ACCOUNT#:   [de-identified]    LOCATION:  OR  [facility] ROOMS 10 EDWP 10  MEDICAL RECORD #:   [id number]       DATE OF separations and secured with 4 0 V-Loc sutures in a running continuous fashion. Once this was accomplished, drains were placed in the subcutaneous tissues, brought out inferiorly, sutured to the skin with 2-0 nylon.   The subcutaneous tissues were closed w

## 2019-01-21 NOTE — BRIEF OP NOTE
Pre-Operative Diagnosis: Ventral hernia without obstruction or gangrene [K43.9]     Post-Operative Diagnosis: Ventral hernia without obstruction or gangrene [K43.9] ADHESIONS       Procedure Performed:   Procedure(s):  REPAIR COMPLEX VENTRAL HERNIA WITH ME

## 2019-01-21 NOTE — ANESTHESIA PREPROCEDURE EVALUATION
PRE-OP EVALUATION    Patient Name: Carlito Bazan    Pre-op Diagnosis: Ventral hernia without obstruction or gangrene [K43.9]    Procedure(s):  REPAIR COMPLEX VENTRAL HERNIA WITH MESH WITH BILATERAL COMPONENT SEPARATION    Surgeon(s) and Role:     * Gre EC TAKE ONE TABLET BY MOUTH TWICE DAILY BEFORE MEALS Disp: 60 tablet Rfl: 1   Fluticasone Propionate 50 MCG/ACT Nasal Suspension 2 sprays by Each Nare route as needed.    Disp:  Rfl:    VITAMIN D 2000 UNIT OR TABS 1 TABLET DAILY Disp:  Rfl:        Allergies ESOPHAGOGASTRODUODENOSCOPY, POSSIBLE BIOPSY, POSSIBLE POLYPECTOMY [id number] N/A 3/13/2018    Performed by Noah Guevara MD at [place] [address]  7/9/12 [facility] Dr Juan Duarte    left inguinal and umbilical hernia repairs   • IRRIGATION & DEBRI patient.       Plan/risks discussed with: patient                Present on Admission:  **None**

## 2019-01-21 NOTE — ANESTHESIA POSTPROCEDURE EVALUATION
[address] [facility] Patient Status:  Surgery Admit   Age/Gender 77year old male MRN [id number]   [facility] SURGERY Attending Tremaine Dunlap MD   Hosp Day # 0 PCP Eloina Gomez MD       Anesthesia Post-op Note    Procedure(s)

## 2019-01-21 NOTE — H&P
HPI:     Eva Wing is a 77year old male who presents for evaluation of Ventral hernia.  Patient underwent a repair of a perforated gastric ulcer and an [place] hospital. Patient returned in [facility] and was found to have a wound infection wi apnea) 10/3/17-split     AHI 14 RDI 14 REM AHI 0 Supine AHI 44 non-supine AHI 1 Sao2 Zain 88% /APAP-6-16cwp/Prism   • Osteoarthrosis, unspecified whether generalized or localized, unspecified site     • Other and unspecified hyperlipidemia     • Type II o TRNK/ARM/LEG         r calf            Current Outpatient Medications:  predniSONE 5 MG Oral Tab TAKE THREE TABLETS BY MOUTH DAILY Disp: 270 tablet Rfl: 2   Pyridostigmine Bromide 60 MG Oral Tab TAKE 2 TABLETS BY MOUTH EVERY MORNING AND 1 tab in afternoon developed, well nourished male, in no apparent distress  SKIN: anicteric  HEENT: normocephalic, sclera aniteric  NECK: supple, no JVD  RESPIRATORY: clear to auscultation  CARDIOVASCULAR: RRR  ABDOMEN: normal active BS, large upper midline ventral hernia wi

## 2019-01-21 NOTE — CONSULTS
General Medicine Consult      Reason for consult: medical management    Consulted by: Dr. Kailey Combs    PCP: Tai Long MD      History of Present Illness: Patient is a 77year old male with mmp including but not limited to HL,  myasthemia gravis, hyperc • Prediabetes     patient states he is pre-diabetic   • Sleep apnea    • Type II or unspecified type diabetes mellitus without mention of complication, not stated as uncontrolled     BORDERLINE   • Visual impairment     glasses   • Vitiligo 5/27/2010 1,000 mg by mouth one time. Disp:  Rfl:    Mycophenolate Mofetil 500 MG Oral Tab Take 500 mg by mouth 2 (two) times daily. Disp:  Rfl:    Pantoprazole Sodium 40 MG Oral Tab EC Take 40 mg by mouth 2 (two) times daily before meals.  Disp:  Rfl:    predniSONE HYDROmorphone HCl, ondansetron HCl, [START ON 1/22/2019] ketorolac (TORADOL) injection, HYDROcodone-acetaminophen **OR** HYDROcodone-acetaminophen, HYDROmorphone (DILAUDID) PCA bolus from bag, Naloxone HCl, DiphenhydrAMINE HCl, Nalbuphine HCl     ALL:  No limited to HL,  myasthemia gravis, hypercoaguable state with hx DVT, GURWINDER, is consulted for medical management s/p ventral hernia repair with mesh and BRITNEY.     **Expected pain  -IV dilaudid pca, oxy prn-encourage transition to oral  -antiemetics, bowel regim

## 2019-01-22 ENCOUNTER — APPOINTMENT (OUTPATIENT)
Dept: GENERAL RADIOLOGY | Facility: HOSPITAL | Age: 69
DRG: 354 | End: 2019-01-22
Attending: HOSPITALIST
Payer: MEDICARE

## 2019-01-22 LAB
ANION GAP SERPL CALC-SCNC: 5 MMOL/L (ref 0–18)
BASOPHILS # BLD AUTO: 0.1 X10(3) UL (ref 0–0.1)
BASOPHILS NFR BLD AUTO: 0.8 %
BUN BLD-MCNC: 16 MG/DL (ref 8–20)
BUN/CREAT SERPL: 15.7 (ref 10–20)
CALCIUM BLD-MCNC: 7.8 MG/DL (ref 8.3–10.3)
CHLORIDE SERPL-SCNC: 109 MMOL/L (ref 101–111)
CO2 SERPL-SCNC: 25 MMOL/L (ref 22–32)
CREAT BLD-MCNC: 1.02 MG/DL (ref 0.7–1.3)
EOSINOPHIL # BLD AUTO: 0.2 X10(3) UL (ref 0–0.3)
EOSINOPHIL NFR BLD AUTO: 1.6 %
ERYTHROCYTE [DISTWIDTH] IN BLOOD BY AUTOMATED COUNT: 14.4 % (ref 11.5–16)
GLUCOSE BLD-MCNC: 107 MG/DL (ref 70–99)
HAV IGM SER QL: 2.2 MG/DL (ref 1.8–2.5)
HCT VFR BLD AUTO: 36.8 % (ref 37–53)
HGB BLD-MCNC: 11.4 G/DL (ref 13–17)
IMMATURE GRANULOCYTE COUNT: 0.05 X10(3) UL (ref 0–1)
IMMATURE GRANULOCYTE RATIO %: 0.4 %
LYMPHOCYTES # BLD AUTO: 0.64 X10(3) UL (ref 0.9–4)
LYMPHOCYTES NFR BLD AUTO: 5.1 %
MCH RBC QN AUTO: 26.1 PG (ref 27–33.2)
MCHC RBC AUTO-ENTMCNC: 31 G/DL (ref 31–37)
MCV RBC AUTO: 84.2 FL (ref 80–99)
MONOCYTES # BLD AUTO: 1.2 X10(3) UL (ref 0.1–1)
MONOCYTES NFR BLD AUTO: 9.6 %
NEUTROPHIL ABS PRELIM: 10.33 X10 (3) UL (ref 1.3–6.7)
NEUTROPHILS # BLD AUTO: 10.33 X10(3) UL (ref 1.3–6.7)
NEUTROPHILS NFR BLD AUTO: 82.5 %
OSMOLALITY SERPL CALC.SUM OF ELEC: 290 MOSM/KG (ref 275–295)
PHOSPHATE SERPL-MCNC: 2.9 MG/DL (ref 2.5–4.9)
PLATELET # BLD AUTO: 280 10(3)UL (ref 150–450)
POTASSIUM SERPL-SCNC: 3.9 MMOL/L (ref 3.6–5.1)
RBC # BLD AUTO: 4.37 X10(6)UL (ref 3.8–5.8)
RED CELL DISTRIBUTION WIDTH-SD: 44.2 FL (ref 35.1–46.3)
SODIUM SERPL-SCNC: 139 MMOL/L (ref 136–144)
WBC # BLD AUTO: 12.5 X10(3) UL (ref 4–13)

## 2019-01-22 PROCEDURE — 97165 OT EVAL LOW COMPLEX 30 MIN: CPT

## 2019-01-22 PROCEDURE — 83735 ASSAY OF MAGNESIUM: CPT | Performed by: SURGERY

## 2019-01-22 PROCEDURE — 97161 PT EVAL LOW COMPLEX 20 MIN: CPT | Performed by: PHYSICAL THERAPIST

## 2019-01-22 PROCEDURE — 85025 COMPLETE CBC W/AUTO DIFF WBC: CPT | Performed by: SURGERY

## 2019-01-22 PROCEDURE — 71045 X-RAY EXAM CHEST 1 VIEW: CPT | Performed by: HOSPITALIST

## 2019-01-22 PROCEDURE — 97116 GAIT TRAINING THERAPY: CPT | Performed by: PHYSICAL THERAPIST

## 2019-01-22 PROCEDURE — 84100 ASSAY OF PHOSPHORUS: CPT | Performed by: SURGERY

## 2019-01-22 PROCEDURE — 97535 SELF CARE MNGMENT TRAINING: CPT

## 2019-01-22 PROCEDURE — 80048 BASIC METABOLIC PNL TOTAL CA: CPT | Performed by: SURGERY

## 2019-01-22 RX ORDER — PYRIDOSTIGMINE BROMIDE 60 MG/1
60 TABLET ORAL EVERY 8 HOURS SCHEDULED
Status: DISCONTINUED | OUTPATIENT
Start: 2019-01-22 | End: 2019-01-23

## 2019-01-22 RX ORDER — PREDNISONE 10 MG/1
10 TABLET ORAL
Status: DISCONTINUED | OUTPATIENT
Start: 2019-01-22 | End: 2019-01-23

## 2019-01-22 RX ORDER — FUROSEMIDE 10 MG/ML
20 INJECTION INTRAMUSCULAR; INTRAVENOUS ONCE
Status: COMPLETED | OUTPATIENT
Start: 2019-01-22 | End: 2019-01-22

## 2019-01-22 RX ORDER — PANTOPRAZOLE SODIUM 40 MG/1
40 TABLET, DELAYED RELEASE ORAL
Status: DISCONTINUED | OUTPATIENT
Start: 2019-01-22 | End: 2019-01-23

## 2019-01-22 RX ORDER — PREDNISONE 1 MG/1
5 TABLET ORAL
Status: DISCONTINUED | OUTPATIENT
Start: 2019-01-23 | End: 2019-01-23

## 2019-01-22 RX ORDER — PANTOPRAZOLE SODIUM 40 MG/1
40 TABLET, DELAYED RELEASE ORAL
Status: DISCONTINUED | OUTPATIENT
Start: 2019-01-22 | End: 2019-01-22

## 2019-01-22 NOTE — PROGRESS NOTES
[facility]  Progress Note    Eva Wing Patient Status:  Inpatient    1950 MRN [id number]   [facility] 3NW-A Attending Cecilia Odonnell MD   Hosp Day # 1 PCP Jessica Julian MD     Subjective:    Patient reports pain controlled Gastric ulcer with hemorrhage, unspecified chronicity     Deficiencies of limbs     Physical deconditioning     Bilateral leg edema      Impression:     78 y/o S/P: Repair of complex abdominal ventral hernia with lysis of adhesions and bilateral component

## 2019-01-22 NOTE — PLAN OF CARE
Patient told RN takes 120mg pyridostigmine am and pm and does not take 60 mg at 1200. RN informed pharmacy. 1700 - patient states he does take 60mg pyridostigmine TID. RN paged Dr. Marga White for correction.     200 - RN paged Dr. Cleary Waterproof for medication

## 2019-01-22 NOTE — PLAN OF CARE
Pt is a&o x4. VSS and afebrile. Sinus rhythm/Sinus mynor on tele. C/o pain at worst 2 out of 10. Receiving pain meds ATC and Dilaudid PCA per MAR. Abdomen is distended and rounded but soft. Bowel sounds active x4.  Denies passing flatus but sttd he is belch

## 2019-01-22 NOTE — PHYSICAL THERAPY NOTE
PHYSICAL THERAPY QUICK EVALUATION - INPATIENT    Room Number: 305/305-A  Evaluation Date: 1/22/2019  Presenting Problem: s/p ventral hernia repair  Physician Order: PT Eval and Treat    Problem List  Active Problems:    * No active hospital problems.  * Vitiligo 5/27/2010       Past Surgical History  Past Surgical History:   Procedure Laterality Date   • COLONOSCOPY      2007   • COLONOSCOPY N/A 9/15/2016    Performed by Iggy Anders MD at [facility] ENDOSCOPY   • COLONOSCOPY & POLYPECTOMY  9/15/16    two rafael poles)    Prior Level of [place]:  Prior to admission pt was modified independent w/ long distance ambulation using walking poles and independent w/ household distances. He lives in a split level home w/ his spouse who can provide assistance PRN. None  Pattern: R Foot flat;L Foot flat(Slow gait speed)  Stoop/Curb Assistance: Contact guard assist  Comment : 5 steps c HR, step to pattern    Skilled Therapy Provided:  Pt initially seen sitting in [facility] w/ family present, agreeable to PT.  Pt sit to stand services. Please re-order if a new functional limitation presents during this admission. GOALS  Patient was able to achieve the following goals . ..     Patient was able to transfer Safely and independently   Patient able to ambulate on level surfaces Sa

## 2019-01-22 NOTE — CONSULTS
[facility]  Report of Consultation    Radha John Patient Status:  Inpatient    1950 MRN [id number]   [facility] 3NW-A Attending Josie Maher MD   Hosp Day # 0 PCP Geovanni Pacheco MD     Impression and Plan:  I deactivated Instructions. Takes 60mg in the afternoon. Disp:  Rfl:    Cholecalciferol (VITAMIN D) 2000 units Oral Cap Take 2,000 Units by mouth daily. Disp:  Rfl:    Calcium Carb-Cholecalciferol (CALCIUM 1000 + D OR) Take 1,000 mg by mouth daily.  Disp:  Rfl:    acetam (TORADOL) injection 15 mg 15 mg Intravenous Q6H   [START ON 1/22/2019] Ketorolac Tromethamine (TORADOL) injection 15 mg 15 mg Intravenous Q6H PRN   HYDROcodone-acetaminophen (NORCO) 5-325 MG per tab 1 tablet 1 tablet Oral Q4H PRN   Or      HYDROcodone-acet prostate ([facility] 1/14/2009    S/P robotic radical prostatectomy 11/07   • Myasthenia gravis ([facility] 8/28/2010   • GURWINDER (obstructive sleep apnea) 3/5/17-PSG    AHI 23 RDI 28 REM AHI 25 Supine AHI 76 non-supine AHI 1.3 Sao2 Zain 86%   • GURWINDER (obstructive sleep ap [place], [facility]   • OTHER SURGICAL HISTORY      s/p robotic radical prostatectomy 11/07 per Dr. Farooq Wallace   • OTHER SURGICAL HISTORY      artificial sphincter   • OTHER SURGICAL HISTORY  01/2018    perforated stomach - emergency surgery   • REMOVAL OF SPERM DUCT( m)   Wt 220 lb 3.8 oz (99.9 kg)   SpO2 97%   BMI 26.12 kg/m²   General: Alert, orientated x3. Cooperative. No apparent distress. HEENT: Exam is unremarkable. Lungs: Res not labored. Abdomen:  Abd binder in place    Genitourinary: Penis; no lesions.  Valeta Rank

## 2019-01-22 NOTE — PROGRESS NOTES
Urology follow-up. Patient seen early today with Saloni PHILLIPS. Patient is voiding. AUS re-activated. Please call urology if any further questions/concerns arise during this admission.       Smam Alanis P.A.-C  [facility]

## 2019-01-22 NOTE — RESPIRATORY THERAPY NOTE
GURWINDER - Equipment Use Daily Summary:                  . Set Mode: AUTO CPAP WITH C-FLEX                . Usage in hours: 7:39                . 90% Pressure (EPAP) level: 13.2                . 90% Insp. Pressure (IPAP): Oneal Claude  AHI: 7.5

## 2019-01-22 NOTE — PLAN OF CARE
Patient AOX4. Dressing intact. PCA in place, instructions explained, to family and patient. DANIA x2 compressed. POC discussed, all safety measures in place. Will continue to monitor.

## 2019-01-23 VITALS
HEART RATE: 87 BPM | WEIGHT: 220.25 LBS | DIASTOLIC BLOOD PRESSURE: 71 MMHG | RESPIRATION RATE: 18 BRPM | OXYGEN SATURATION: 93 % | BODY MASS INDEX: 26 KG/M2 | SYSTOLIC BLOOD PRESSURE: 136 MMHG | HEIGHT: 77 IN | TEMPERATURE: 99 F

## 2019-01-23 LAB
ERYTHROCYTE [DISTWIDTH] IN BLOOD BY AUTOMATED COUNT: 14.6 % (ref 11.5–16)
GLUCOSE BLD-MCNC: 114 MG/DL (ref 65–99)
GLUCOSE BLD-MCNC: 141 MG/DL (ref 65–99)
GLUCOSE BLD-MCNC: 88 MG/DL (ref 65–99)
GLUCOSE BLD-MCNC: 93 MG/DL (ref 65–99)
HCT VFR BLD AUTO: 38 % (ref 37–53)
HGB BLD-MCNC: 12.3 G/DL (ref 13–17)
MCH RBC QN AUTO: 26.8 PG (ref 27–33.2)
MCHC RBC AUTO-ENTMCNC: 32.4 G/DL (ref 31–37)
MCV RBC AUTO: 82.8 FL (ref 80–99)
PLATELET # BLD AUTO: 281 10(3)UL (ref 150–450)
RBC # BLD AUTO: 4.59 X10(6)UL (ref 3.8–5.8)
RED CELL DISTRIBUTION WIDTH-SD: 43.6 FL (ref 35.1–46.3)
WBC # BLD AUTO: 11.5 X10(3) UL (ref 4–13)

## 2019-01-23 PROCEDURE — 82962 GLUCOSE BLOOD TEST: CPT

## 2019-01-23 PROCEDURE — 85027 COMPLETE CBC AUTOMATED: CPT | Performed by: PHYSICIAN ASSISTANT

## 2019-01-23 RX ORDER — HYDROCODONE BITARTRATE AND ACETAMINOPHEN 5; 325 MG/1; MG/1
1 TABLET ORAL EVERY 4 HOURS PRN
Qty: 40 TABLET | Refills: 0 | Status: SHIPPED | OUTPATIENT
Start: 2019-01-23 | End: 2019-02-02

## 2019-01-23 RX ORDER — HEPARIN SODIUM 5000 [USP'U]/ML
5000 INJECTION, SOLUTION INTRAVENOUS; SUBCUTANEOUS EVERY 8 HOURS SCHEDULED
Status: DISCONTINUED | OUTPATIENT
Start: 2019-01-23 | End: 2019-01-23

## 2019-01-23 RX ORDER — AMOXICILLIN AND CLAVULANATE POTASSIUM 875; 125 MG/1; MG/1
1 TABLET, FILM COATED ORAL 2 TIMES DAILY
Qty: 14 TABLET | Refills: 0 | Status: SHIPPED | OUTPATIENT
Start: 2019-01-23 | End: 2019-01-30

## 2019-01-23 RX ORDER — PYRIDOSTIGMINE BROMIDE 60 MG/1
60 TABLET ORAL 3 TIMES DAILY
Qty: 90 TABLET | Refills: 0 | Status: SHIPPED | OUTPATIENT
Start: 2019-01-23 | End: 2020-01-06

## 2019-01-23 RX ORDER — PREDNISONE 1 MG/1
TABLET ORAL
Qty: 90 TABLET | Refills: 0 | Status: SHIPPED | OUTPATIENT
Start: 2019-01-23 | End: 2019-11-15 | Stop reason: ALTCHOICE

## 2019-01-23 NOTE — PLAN OF CARE
Patient complaining of strong congested cough. Re-assessed lung sounds, bases sound coarse. Splinting pillow given, patient states too much pain when coughing. Able to achieve 2000 with IS. 94% on RA. Paged Dr. Hanh Walker.      1700: patients 02 sats 8

## 2019-01-23 NOTE — RESPIRATORY THERAPY NOTE
GURWINDER - Equipment Use Daily Summary:                  . Set Mode: AUTO CPAP WITH C-FLEX                . Usage in hours: 4:47                . 90% Pressure (EPAP) level:14.5                . 90% Insp. Pressure (IPAP): Samantha Rothman  AHI:14.2

## 2019-01-23 NOTE — PLAN OF CARE
Patient states feels better, able to expectorate large clear sputum. Ambulating halls with wife. 94% of RA. Will monitor.

## 2019-01-23 NOTE — PROGRESS NOTES
[facility] Hospitalist Progress Note                                                                   [address]  5/19/1950    CC: FU post op    Interval History:  - Doing well  - PCA being NA  139   K  3.9   CL  109   CO2  25.0           ROS: no change to ROS from my documentation yesterday, except as otherwise noted in the Interval History above.     Assessment/Plan:      77year old male with mmp including but not limited to HL,  myasthem

## 2019-01-23 NOTE — PLAN OF CARE
Pt is a&o x4. VSS and afebrile. SR/SB when asleep on tele. C/o pain at worst 5 out of 10. Pain meds being offered/provided as needed per MAR. Abdomen is soft and rounded. Bowel sounds hypoactive x4. Denies passing flatus, but is belching.  Midline incision

## 2019-01-23 NOTE — PROGRESS NOTES
[facility]  Progress Note    Ron Rubinstein Patient Status:  Inpatient    1950 MRN [id number]   [facility] 3NW-A Attending Marlene Toledo MD   Hosp Day # 2 PCP Oscar Kim MD     Subjective:    Patient tolerating full liquids component separation with implantation of mesh.  -afebrile, no leukocytosis  -pain controlled with PO pain medication    Plan:    Encourage ambulation/IS  Advance diet as tolerated  Reviewed DANIA teaching  Possibly home tonight or tomorrow pending diet/pain

## 2019-01-23 NOTE — PROGRESS NOTES
[facility] Hospitalist Progress Note                                                                   [address]  5/19/1950    CC: FU post op    Interval History:  - Doing well  - On room a 16   CREATSERUM  1.02   GFRAA  86   GFRNAA  74   CA  7.8*   NA  139   K  3.9   CL  109   CO2  25.0           ROS: no change to ROS from my documentation yesterday, except as otherwise noted in the Interval History above.     Assessment/Plan:      77 year o

## 2019-01-24 NOTE — DISCHARGE SUMMARY
[facility]  Discharge Summary    Madelyn Cordova Patient Status:  Inpatient    1950 MRN [id number]   [facility] 3NW-A Attending No att. providers found   [facility] Day # 2 PCP Pam Nelson MD     Date of Admission: 2019    Da Medications: Discharge Medication List as of 1/23/2019  6:26 PM    START taking these medications    HYDROcodone-acetaminophen 5-325 MG Oral Tab  Take 1 tablet by mouth every 4 (four) hours as needed. , Print, Disp-40 tablet, R-0    Amoxicillin-Pot Clavulan apply Misc  tests once daily, Dx: E11.42, without long-term current use of insulin, Normal, Disp-100 each, R-3      STOP taking these medications    Acetaminophen (ACETAMINOPHEN EXTRA STRENGTH) 500 MG Oral Cap          Follow up Visits:  Follow-up with Dr Jasvir Kilgore

## 2019-01-24 NOTE — PLAN OF CARE
Patients IV d/c'd, catheter intact. All discharge instructions explained to patient, daughter and wife. Supplies given for [address] [place] [address]. Patient discharged via wheelchair with support staff.

## 2019-04-23 PROCEDURE — 81003 URINALYSIS AUTO W/O SCOPE: CPT | Performed by: INTERNAL MEDICINE

## 2019-04-26 PROBLEM — I82.401 ACUTE DEEP VEIN THROMBOSIS (DVT) OF RIGHT LOWER EXTREMITY, UNSPECIFIED VEIN (HCC): Status: RESOLVED | Noted: 2018-03-09 | Resolved: 2019-04-26

## 2019-04-26 PROBLEM — Z98.890 S/P LAPAROTOMY: Status: RESOLVED | Noted: 2018-02-01 | Resolved: 2019-04-26

## 2019-04-26 PROBLEM — K25.4 GASTRIC ULCER WITH HEMORRHAGE, UNSPECIFIED CHRONICITY: Status: RESOLVED | Noted: 2018-04-09 | Resolved: 2019-04-26

## 2019-04-26 PROBLEM — R29.898 DEFICIENCIES OF LIMBS: Status: RESOLVED | Noted: 2018-06-08 | Resolved: 2019-04-26

## 2019-04-26 PROBLEM — Z87.11 HISTORY OF GASTRIC ULCER: Status: ACTIVE | Noted: 2019-04-26

## 2019-04-26 PROBLEM — R53.81 PHYSICAL DECONDITIONING: Status: RESOLVED | Noted: 2018-06-12 | Resolved: 2019-04-26

## 2019-04-26 PROBLEM — K43.9 VENTRAL HERNIA WITHOUT OBSTRUCTION OR GANGRENE: Status: RESOLVED | Noted: 2018-03-09 | Resolved: 2019-04-26

## 2019-04-26 PROCEDURE — 86803 HEPATITIS C AB TEST: CPT | Performed by: INTERNAL MEDICINE

## 2019-12-02 PROBLEM — S46.011A STRAIN OF RIGHT ROTATOR CUFF CAPSULE, INITIAL ENCOUNTER: Status: ACTIVE | Noted: 2019-12-02

## 2019-12-16 PROBLEM — S46.011D TRAUMATIC COMPLETE TEAR OF RIGHT ROTATOR CUFF, SUBSEQUENT ENCOUNTER: Status: ACTIVE | Noted: 2019-12-16

## 2020-01-06 PROBLEM — S46.811A FULL THICKNESS TEAR OF RIGHT SUBSCAPULARIS TENDON: Status: ACTIVE | Noted: 2020-01-06

## 2020-06-11 RX ORDER — MELATONIN
325
COMMUNITY
End: 2021-11-19

## 2020-06-11 RX ORDER — DEXTROSE MONOHYDRATE 25 G/50ML
50 INJECTION, SOLUTION INTRAVENOUS
Status: CANCELLED | OUTPATIENT
Start: 2020-06-11

## 2020-06-19 ENCOUNTER — LAB ENCOUNTER (OUTPATIENT)
Dept: LAB | Facility: HOSPITAL | Age: 70
End: 2020-06-19
Attending: SURGERY
Payer: MEDICARE

## 2020-06-19 DIAGNOSIS — K40.90 INGUINAL HERNIA: ICD-10-CM

## 2020-06-19 DIAGNOSIS — Z01.812 PRE-PROCEDURE LAB EXAM: Primary | ICD-10-CM

## 2020-06-22 ENCOUNTER — ANESTHESIA (OUTPATIENT)
Dept: SURGERY | Facility: HOSPITAL | Age: 70
End: 2020-06-22
Payer: MEDICARE

## 2020-06-22 ENCOUNTER — HOSPITAL ENCOUNTER (OUTPATIENT)
Facility: HOSPITAL | Age: 70
Setting detail: HOSPITAL OUTPATIENT SURGERY
Discharge: HOME | End: 2020-06-22
Attending: SURGERY | Admitting: SURGERY
Payer: MEDICARE

## 2020-06-22 ENCOUNTER — ANESTHESIA EVENT (OUTPATIENT)
Dept: SURGERY | Facility: HOSPITAL | Age: 70
End: 2020-06-22
Payer: MEDICARE

## 2020-06-22 VITALS
TEMPERATURE: 98 F | HEART RATE: 69 BPM | DIASTOLIC BLOOD PRESSURE: 60 MMHG | RESPIRATION RATE: 18 BRPM | OXYGEN SATURATION: 97 % | HEIGHT: 78 IN | SYSTOLIC BLOOD PRESSURE: 123 MMHG | BODY MASS INDEX: 25.15 KG/M2 | WEIGHT: 217.38 LBS

## 2020-06-22 DIAGNOSIS — K40.90 INGUINAL HERNIA: Primary | ICD-10-CM

## 2020-06-22 DIAGNOSIS — K40.90 NON-RECURRENT UNILATERAL INGUINAL HERNIA WITHOUT OBSTRUCTION OR GANGRENE: ICD-10-CM

## 2020-06-22 PROCEDURE — 0YU54JZ SUPPLEMENT RIGHT INGUINAL REGION WITH SYNTHETIC SUBSTITUTE, PERCUTANEOUS ENDOSCOPIC APPROACH: ICD-10-PCS | Performed by: SURGERY

## 2020-06-22 PROCEDURE — 82962 GLUCOSE BLOOD TEST: CPT

## 2020-06-22 PROCEDURE — 8E0W4CZ ROBOTIC ASSISTED PROCEDURE OF TRUNK REGION, PERCUTANEOUS ENDOSCOPIC APPROACH: ICD-10-PCS | Performed by: SURGERY

## 2020-06-22 DEVICE — PROGRIP MESH: Type: IMPLANTABLE DEVICE | Site: ABDOMEN | Status: FUNCTIONAL

## 2020-06-22 RX ORDER — METOCLOPRAMIDE HYDROCHLORIDE 5 MG/ML
INJECTION INTRAMUSCULAR; INTRAVENOUS AS NEEDED
Status: DISCONTINUED | OUTPATIENT
Start: 2020-06-22 | End: 2020-06-22 | Stop reason: SURG

## 2020-06-22 RX ORDER — DEXTROSE MONOHYDRATE 25 G/50ML
50 INJECTION, SOLUTION INTRAVENOUS
Status: DISCONTINUED | OUTPATIENT
Start: 2020-06-22 | End: 2020-06-22

## 2020-06-22 RX ORDER — HEPARIN SODIUM 5000 [USP'U]/ML
5000 INJECTION, SOLUTION INTRAVENOUS; SUBCUTANEOUS ONCE
Status: COMPLETED | OUTPATIENT
Start: 2020-06-22 | End: 2020-06-22

## 2020-06-22 RX ORDER — ROCURONIUM BROMIDE 10 MG/ML
INJECTION, SOLUTION INTRAVENOUS AS NEEDED
Status: DISCONTINUED | OUTPATIENT
Start: 2020-06-22 | End: 2020-06-22 | Stop reason: SURG

## 2020-06-22 RX ORDER — HYDROCODONE BITARTRATE AND ACETAMINOPHEN 5; 325 MG/1; MG/1
1-2 TABLET ORAL EVERY 6 HOURS PRN
Qty: 30 TABLET | Refills: 0 | Status: SHIPPED | OUTPATIENT
Start: 2020-06-22 | End: 2020-11-05

## 2020-06-22 RX ORDER — HYDROCODONE BITARTRATE AND ACETAMINOPHEN 5; 325 MG/1; MG/1
1 TABLET ORAL AS NEEDED
Status: DISCONTINUED | OUTPATIENT
Start: 2020-06-22 | End: 2020-06-22

## 2020-06-22 RX ORDER — SODIUM CHLORIDE, SODIUM LACTATE, POTASSIUM CHLORIDE, CALCIUM CHLORIDE 600; 310; 30; 20 MG/100ML; MG/100ML; MG/100ML; MG/100ML
INJECTION, SOLUTION INTRAVENOUS CONTINUOUS
Status: DISCONTINUED | OUTPATIENT
Start: 2020-06-22 | End: 2020-06-22

## 2020-06-22 RX ORDER — MEPERIDINE HYDROCHLORIDE 25 MG/ML
25 INJECTION INTRAMUSCULAR; INTRAVENOUS; SUBCUTANEOUS
Status: DISCONTINUED | OUTPATIENT
Start: 2020-06-22 | End: 2020-06-22

## 2020-06-22 RX ORDER — ACETAMINOPHEN 500 MG
1000 TABLET ORAL ONCE AS NEEDED
Status: DISCONTINUED | OUTPATIENT
Start: 2020-06-22 | End: 2020-06-22

## 2020-06-22 RX ORDER — MIDAZOLAM HYDROCHLORIDE 1 MG/ML
1 INJECTION INTRAMUSCULAR; INTRAVENOUS EVERY 5 MIN PRN
Status: DISCONTINUED | OUTPATIENT
Start: 2020-06-22 | End: 2020-06-22

## 2020-06-22 RX ORDER — ACETAMINOPHEN 500 MG
1000 TABLET ORAL ONCE
Status: DISCONTINUED | OUTPATIENT
Start: 2020-06-22 | End: 2020-06-22 | Stop reason: HOSPADM

## 2020-06-22 RX ORDER — NALOXONE HYDROCHLORIDE 0.4 MG/ML
80 INJECTION, SOLUTION INTRAMUSCULAR; INTRAVENOUS; SUBCUTANEOUS AS NEEDED
Status: DISCONTINUED | OUTPATIENT
Start: 2020-06-22 | End: 2020-06-22

## 2020-06-22 RX ORDER — HYDROMORPHONE HYDROCHLORIDE 1 MG/ML
INJECTION, SOLUTION INTRAMUSCULAR; INTRAVENOUS; SUBCUTANEOUS
Status: COMPLETED
Start: 2020-06-22 | End: 2020-06-22

## 2020-06-22 RX ORDER — CEFAZOLIN SODIUM/WATER 2 G/20 ML
2 SYRINGE (ML) INTRAVENOUS ONCE
Status: COMPLETED | OUTPATIENT
Start: 2020-06-22 | End: 2020-06-22

## 2020-06-22 RX ORDER — ONDANSETRON 2 MG/ML
4 INJECTION INTRAMUSCULAR; INTRAVENOUS AS NEEDED
Status: DISCONTINUED | OUTPATIENT
Start: 2020-06-22 | End: 2020-06-22

## 2020-06-22 RX ORDER — MIDAZOLAM HYDROCHLORIDE 1 MG/ML
INJECTION INTRAMUSCULAR; INTRAVENOUS
Status: COMPLETED
Start: 2020-06-22 | End: 2020-06-22

## 2020-06-22 RX ORDER — BUPIVACAINE HYDROCHLORIDE AND EPINEPHRINE 5; 5 MG/ML; UG/ML
INJECTION, SOLUTION EPIDURAL; INTRACAUDAL; PERINEURAL AS NEEDED
Status: DISCONTINUED | OUTPATIENT
Start: 2020-06-22 | End: 2020-06-22 | Stop reason: HOSPADM

## 2020-06-22 RX ORDER — HYDROCODONE BITARTRATE AND ACETAMINOPHEN 5; 325 MG/1; MG/1
2 TABLET ORAL AS NEEDED
Status: DISCONTINUED | OUTPATIENT
Start: 2020-06-22 | End: 2020-06-22

## 2020-06-22 RX ORDER — MIDAZOLAM HYDROCHLORIDE 1 MG/ML
INJECTION INTRAMUSCULAR; INTRAVENOUS AS NEEDED
Status: DISCONTINUED | OUTPATIENT
Start: 2020-06-22 | End: 2020-06-22 | Stop reason: SURG

## 2020-06-22 RX ORDER — HYDROMORPHONE HYDROCHLORIDE 1 MG/ML
0.5 INJECTION, SOLUTION INTRAMUSCULAR; INTRAVENOUS; SUBCUTANEOUS EVERY 5 MIN PRN
Status: DISCONTINUED | OUTPATIENT
Start: 2020-06-22 | End: 2020-06-22

## 2020-06-22 RX ORDER — ONDANSETRON 2 MG/ML
INJECTION INTRAMUSCULAR; INTRAVENOUS AS NEEDED
Status: DISCONTINUED | OUTPATIENT
Start: 2020-06-22 | End: 2020-06-22 | Stop reason: SURG

## 2020-06-22 RX ADMIN — ONDANSETRON 4 MG: 2 INJECTION INTRAMUSCULAR; INTRAVENOUS at 08:54:00

## 2020-06-22 RX ADMIN — MIDAZOLAM HYDROCHLORIDE 2 MG: 1 INJECTION INTRAMUSCULAR; INTRAVENOUS at 07:34:00

## 2020-06-22 RX ADMIN — ROCURONIUM BROMIDE 50 MG: 10 INJECTION, SOLUTION INTRAVENOUS at 07:39:00

## 2020-06-22 RX ADMIN — CEFAZOLIN SODIUM/WATER 2 G: 2 G/20 ML SYRINGE (ML) INTRAVENOUS at 07:36:00

## 2020-06-22 RX ADMIN — METOCLOPRAMIDE HYDROCHLORIDE 10 MG: 5 INJECTION INTRAMUSCULAR; INTRAVENOUS at 08:54:00

## 2020-06-22 NOTE — ANESTHESIA PREPROCEDURE EVALUATION
PRE-OP EVALUATION    Patient Name: Cheng Joe    Pre-op Diagnosis: Non-recurrent unilateral inguinal hernia without obstruction or gangrene [K40.90]    Procedure(s):  XI ROBOT ASSISTED REPAIR RIGHT INGUINAL HERNIA WITH MESH POSSIBLE OPEN    Surg daily., Disp: , Rfl:         Allergies: Patient has no known allergies. Anesthesia Evaluation    Patient summary reviewed.     Anesthetic Complications  (-) history of anesthetic complications         GI/Hepatic/Renal         (+) hiatal hernia  (+) PUD SURGERY  7/9/12 [facility] Dr Castro Wagner    left inguinal and umbilical hernia repairs   • HERNIA SURGERY  01/21/2019    Ventral w/mesh   • HERNIA VENTRAL REPAIR N/A 1/21/2019    Performed by Rosa Elena Moreno MD at [address] N/A 2/8/2018    Per notable dental history. Pulmonary    Pulmonary exam normal.                 Other findings            ASA: 2   Plan: general  NPO status verified and patient meets guidelines.     Post-procedure pain management plan discussed with surgeon and patien

## 2020-06-22 NOTE — OPERATIVE REPORT
Report of [address] Patient Status:  Hospital Outpatient Surgery    1950 MRN [id number]   [facility] SURGERY Attending Aleena Tejada MD   [facility] Day # 0 PCP Tam Martinez MD       2020    Don Archer decompressed and all ports were removed. Skin incisions were closed with 4-0 Vicryl in a subcuticular fashion. The wounds were infiltrated with local anesthesia, Dermabond applied directly to the incision surface.   The patient tolerated the procedure wel

## 2020-06-22 NOTE — ANESTHESIA PROCEDURE NOTES
Airway  Date/Time: 6/22/2020 7:40 AM  Urgency: elective      General Information and Staff    Patient location during procedure: OR  Anesthesiologist: Ruslan Solorzano MD  Performed: anesthesiologist     Indications and Patient Condition  Indications for ai

## 2020-06-22 NOTE — ANESTHESIA POSTPROCEDURE EVALUATION
Hope Patient Status:  Hospital Outpatient Surgery   Age/Gender 65year old male MRN [id number]   Location [address] [facility] Attending Stefanie Gutierres MD   Hosp Day # 0 PCP MD Kate Brady

## 2020-06-22 NOTE — H&P
HPI:     Lowell Kumari is a 68year old male who presents for evaluation of a right inguinal hernia. Patient describes a bulge and mild discomfort especially with physical activity.  Patient denies any symptoms of obstruction or strangulation.      Hector Sleep apnea     • Type II or unspecified type diabetes mellitus without mention of complication, not stated as uncontrolled       BORDERLINE   • Visual impairment       glasses   • Vitiligo 5/27/2010               Past Surgical History:   Procedure Lateral Dispense Refill   • Mycophenolate Mofetil 500 MG Oral Tab Take 2 tabs (1000 mg) po bid       • predniSONE 5 MG Oral Tab         • Pyridostigmine Bromide 60 MG Oral Tab 3 x  Per day       • PRAVASTATIN SODIUM 40 MG Oral Tab TAKE 1 TABLET BY MOUTH  NIGHTLY 9 Other (Other) Father           BPH   • Other (Other) Mother           MULTIPLE SCLEROSIS   • Other (Other) Brother           KIDNEY STONES         Social History    Tobacco Use      Smoking status: Never Smoker      Smokeless tobacco: Never Used    Alcohol

## 2020-11-09 PROBLEM — I82.409 DVT (DEEP VENOUS THROMBOSIS) (HCC): Status: ACTIVE | Noted: 2018-02-19

## 2020-11-09 PROBLEM — R53.83 FATIGUE: Status: ACTIVE | Noted: 2017-06-21

## 2020-11-09 PROBLEM — I82.401 ACUTE DEEP VEIN THROMBOSIS (DVT) OF RIGHT LOWER EXTREMITY (HCC): Status: ACTIVE | Noted: 2018-03-09

## 2021-04-12 DIAGNOSIS — Z23 NEED FOR VACCINATION: ICD-10-CM

## 2021-05-21 PROBLEM — G95.89 LUMBAR EPIDURAL MASS (HCC): Status: ACTIVE | Noted: 2021-05-21

## 2021-05-21 PROBLEM — S46.011A STRAIN OF RIGHT ROTATOR CUFF CAPSULE, INITIAL ENCOUNTER: Status: RESOLVED | Noted: 2019-12-02 | Resolved: 2021-05-21

## 2021-05-21 PROBLEM — Z86.718 HISTORY OF DVT OF LOWER EXTREMITY: Status: ACTIVE | Noted: 2018-03-09

## 2021-11-19 PROBLEM — G31.84 MCI (MILD COGNITIVE IMPAIRMENT) WITH MEMORY LOSS: Status: ACTIVE | Noted: 2021-11-19

## 2022-01-12 PROBLEM — S62.111D CLOSED DISPLACED FRACTURE OF TRIQUETRUM OF RIGHT WRIST WITH ROUTINE HEALING, SUBSEQUENT ENCOUNTER: Status: ACTIVE | Noted: 2022-01-12

## 2023-04-07 NOTE — PROGRESS NOTES
Progress Note Details  Patient Name: Alanna York Date: 2/20/2018   Patient Number: [id number] Physician / Cynthia Oxford: Joseline Garcia   Patient YOB: 1950 Facility: [facility]    Chief Complaint  This information was obtain Type II Diabetes  Hyperlipidemia  Osteoarthrosis  Sleep apnea  Myasthenia gravis (2010)  Prostate cancer  Onchomycosis  Diverticulitis  Hiatal Hernia  Gastro Esoph.  Reflux Disease (GERD)  Deep Vein Thrombosis (DVT) - 2/19/2018 (Right leg 3)    Complaints a BP WNL. Pulse RRR. RR within normal limits. Afebrile. Within Ideal body weight range. Alert, calm, well developed, in no apparent distress.  Height/Length: 78 in (198.12 cm), Weight: 209 lbs (95 kgs), BMI: 24.1, Temperature: 97.6 °F (36.44 °C), Pulse: 71 bp Wound #2 Proximal Abdomen is an acute Full Thickness Surgical Wound and has received an outcome of Resolved. Subsequent wound encounter measurements are 0cm length x 0cm width with no measurable depth, with an area of 0 sq cm . No tunneling has been noted. Wound #1 (Surgical Wound) is located on the distal abdomen. A selective debridement with a total area debrided of 1.75 sq cm was performed by Bjorn Pérez NP. to remove devitalized tissue: biofilm and slough.  The following instrument(s) were used: c Discussed the Plan of Care at bedside with patient. The patient verbally acknowledges understanding of all instructions and all questions were answered. - Spouse verbalized understanding as well.   Wound type: - Surgical  Last sharp debridement date: - 2/20 Attending Only

## 2024-09-06 PROBLEM — F02.A0: Status: ACTIVE | Noted: 2024-01-10

## 2024-09-06 PROBLEM — U07.1 COVID-19: Status: ACTIVE | Noted: 2022-10-10

## 2024-09-06 PROBLEM — R26.9 GAIT ABNORMALITY: Status: ACTIVE | Noted: 2023-03-09

## 2024-09-06 PROBLEM — D84.9 IMMUNODEFICIENCY, UNSPECIFIED (HCC): Status: ACTIVE | Noted: 2023-05-23

## 2024-09-06 PROBLEM — D33.4: Status: ACTIVE | Noted: 2022-05-24

## 2024-09-06 RX ORDER — CALCIUM CARB/VITAMIN D3/VIT K1 500-500-40
100 TABLET,CHEWABLE ORAL DAILY
COMMUNITY

## 2024-09-19 ENCOUNTER — ANESTHESIA EVENT (OUTPATIENT)
Dept: ENDOSCOPY | Facility: HOSPITAL | Age: 74
End: 2024-09-19
Payer: MEDICARE

## 2024-09-19 ENCOUNTER — HOSPITAL ENCOUNTER (OUTPATIENT)
Facility: HOSPITAL | Age: 74
Setting detail: HOSPITAL OUTPATIENT SURGERY
Discharge: HOME | End: 2024-09-19
Attending: INTERNAL MEDICINE | Admitting: INTERNAL MEDICINE
Payer: MEDICARE

## 2024-09-19 ENCOUNTER — ANESTHESIA (OUTPATIENT)
Dept: ENDOSCOPY | Facility: HOSPITAL | Age: 74
End: 2024-09-19
Payer: MEDICARE

## 2024-09-19 VITALS
RESPIRATION RATE: 22 BRPM | BODY MASS INDEX: 22.68 KG/M2 | DIASTOLIC BLOOD PRESSURE: 61 MMHG | WEIGHT: 196 LBS | OXYGEN SATURATION: 99 % | TEMPERATURE: 98 F | HEIGHT: 78 IN | SYSTOLIC BLOOD PRESSURE: 133 MMHG | HEART RATE: 56 BPM

## 2024-09-19 LAB — GLUCOSE BLD-MCNC: 117 MG/DL (ref 70–99)

## 2024-09-19 PROCEDURE — 88305 TISSUE EXAM BY PATHOLOGIST: CPT | Performed by: INTERNAL MEDICINE

## 2024-09-19 PROCEDURE — 0DBL8ZX EXCISION OF TRANSVERSE COLON, VIA NATURAL OR ARTIFICIAL OPENING ENDOSCOPIC, DIAGNOSTIC: ICD-10-PCS | Performed by: INTERNAL MEDICINE

## 2024-09-19 PROCEDURE — 82962 GLUCOSE BLOOD TEST: CPT

## 2024-09-19 RX ORDER — SODIUM CHLORIDE, SODIUM LACTATE, POTASSIUM CHLORIDE, CALCIUM CHLORIDE 600; 310; 30; 20 MG/100ML; MG/100ML; MG/100ML; MG/100ML
INJECTION, SOLUTION INTRAVENOUS CONTINUOUS
Status: DISCONTINUED | OUTPATIENT
Start: 2024-09-19 | End: 2024-09-19

## 2024-09-19 RX ORDER — NALOXONE HYDROCHLORIDE 0.4 MG/ML
0.08 INJECTION, SOLUTION INTRAMUSCULAR; INTRAVENOUS; SUBCUTANEOUS ONCE AS NEEDED
Status: DISCONTINUED | OUTPATIENT
Start: 2024-09-19 | End: 2024-09-19

## 2024-09-19 NOTE — OPERATIVE REPORT
PATIENT NAME: Zackary Thurston  MRN: [id number]  DATE OF OPERATION:  9/19/24  REFERRING PHYSICIAN: Dr. Tay  Medications:  MAC  Date of last COLONOSCOPY:  2019  PREOPERATIVE DIAGNOSIS                personal history of colon polyps (2016)  POSTOPERATIVE DIAGNOSIS:  5 mm transverse colon polyps x 3 removed via cold biopsy forceps.  Diverticulosis were scattered throughout the colon.     PROCEDURE PERFORMED:               Colonoscopy with cold biopsy forceps polypectomy   Endoscopist:                                             Arturo Peralta MD     PROCEDURE AND FINDINGS: The patient was placed into the left lateral decubitus after informed consent was obtained.  All questions were answered.  An updated history and physical were performed and an ASA score of 2 was assigned.  Informed consent was obtained prior to the procedure, with review of possible complications including bleeding, infection, and missed polyps and or cancer.  MAC sedation was administered.    A digital rectal exam was performed and was normal.  The video pediatric colonoscope was passed from anus to cecum.  The ileocecal valve, appendiceal orfice, hepatic and splenic flexures were all well visualized.  The bowel preparation was rated on the Aronchick bowel prep scale as 1. (1 - excellent > 95% mucosa seen; 2 - good - clear liquid up to 25% of the mucosa, 90% mucosa seen;  3 - fair - semisolid stool not suctioned, but 90% of the mucosa seen; 4 - poor - semisolid stool not suctioned, but < 90% mucosa seen; 5 - inadequate - repeat prep needed) .                Findings included 5 mm transverse colon polyps x 3 removed via cold biopsy forceps.  Diverticulosis were scattered throughout the colon.  On retroflexion of the scope in the anorectum, normal internal hemorrhoids were noted.     The scope withdrawal from cecum to anus was 13 minutes.    RECOMMENDATIONS         1. The patient will be provided with a written summary of today's endoscopic  findings.        2. The patient will be notified with biopsy results in 2 - 4 working days.         3. Recommendations regarding repeat colonoscopy will be made once the biopsy results are reviewed.             Arturo Peralta MD, Gastroenterologist  Cc: Dr. Tay

## 2024-09-19 NOTE — DISCHARGE INSTRUCTIONS
ENDOSCOPY DISCHARGE INSTRUCTIONS    Procedure Performed:   Colonoscopy  Endoscopist: No name on file  FINDINGS:   Diverticulosis (pockets in colon that develop with age and lack of fiber intake) and Colon polyp(s) (a growth in the colon)    MEDICATIONS:  You may resume all other medications today    DIET:  General    BIOPSIES:  Biopsies were taken (you will be notified of results in 7-10 days)      ADDITIONAL RECOMMENDATIONS:  Recommendations regarding repeat colonoscopy will be made once the biopsy results are reviewed.      Activity for remainder of today:    REST TODAY  DO NOT drive or operate heavy machinery  DO NOT drink any alcoholic beverages  DO NOT sign any legal documents or make any important decisions    After your procedure(s):  It is not unusual to feel bloated or gassy .  Passing gas and belching is encouraged. Lying on your left side with your knees flexed may relieve the discomfort. A hot pack to the abdomen may also help.    After your gastroscopy (upper endoscopy): You may experience a slight sore throat which will subside. Throat lozenges or salt water gargle can be used.    FOLLOW-UP:  Contact the office at [phone] for follow-up appointment if needed or if you develop any of the following:    Severe abdominal pain/discomfort       Excessive bleeding or black tarry stool  Difficulty breathing or swallowing        Persistent nausea,vomiting, or a fever above 100 degrees or chills

## 2024-09-19 NOTE — H&P
[facility]-Gastroenterology     Zackary Thurston  [id number]  5/19/1950     Willian Tay     Chief Complaint and History of Present Illness:      Patient presents with:  Colonoscopy Screening        Patient is a 74 year old male with PMH of HLD, history of DVT, GURWINDER, Type 2 DM, Myasthenia Gravis, and GERD who presents today for colonoscopy screening.      Last colonoscopy completed with Dr. Peralta 10/2019 and found to have diverticulosis otherwise was unremarkable. Recommended to repeat colonoscopy in 5 years due to personal history of colon polyps. Due now for colonoscopy.      Denies nausea, vomiting, heartburn, dysphagia, odynophagia, abdominal pain, unintentional weight loss, changes in bowel habits, diarrhea, constipation, hematochezia, melena.     NSAID use: none  Alcohol: socially   Tobacco: none     Family history negative for GI/Pancreas or liver disorders/cancers     History of obstructive sleep apnea: yes - BiPap  History of respiratory illness/home oxygen supplementation: No  History of cardiac illness/pacemaker/AICD/blood thinners: None  History of anxiety/depression or chronic narcotic pain medication use: No  History of diabetes: yes  History of mobility issues: No        Prior GI Procedures:   History of Prep or Sedation intolerance:     PATIENT NAME: Zackary Thurston  MRN: [id number]  DATE OF OPERATION: 10/18/19  REFERRING PHYSICIAN: Dr. Tay  Medications:  MAC  Date of last COLONOSCOPY:  2016  PREOPERATIVE DIAGNOSIS                personal history of colon polyps (2016)  POSTOPERATIVE DIAGNOSIS:  Diverticulosis were scattered throughout the colon.   Otherwise normal colon exam.      PROCEDURE PERFORMED:               Colonoscopy   Endoscopist:                                             Arturo Peralta MD      PROCEDURE AND FINDINGS: The patient was placed into the left lateral decubitus after informed consent was obtained.  All questions were answered.  An updated history and  physical were performed and an ASA score of 3 was assigned.  Informed consent was obtained prior to the procedure, with review of possible complications including bleeding, infection, and missed polyps and or cancer.  MAC sedation was administered.              A digital rectal exam was performed and was normal.  The video adult colonoscope was passed from anus to cecum.  The ileocecal valve, appendiceal orfice, hepatic and splenic flexures were all well visualized.  The bowel preparation was rated on the Aronchick bowel prep scale as 1. (1 - excellent > 95% mucosa seen; 2 - good - clear liquid up to 25% of the mucosa, 90% mucosa seen;  3 - fair - semisolid stool not suctioned, but 90% of the mucosa seen; 4 - poor - semisolid stool not suctioned, but < 90% mucosa seen; 5 - inadequate - repeat prep needed) .               Findings included no polyps.  Diverticulosis were scattered throughout the colon.  On retroflexion of the scope in the anorectum, normal internal hemorrhoids were noted.               The scope withdrawal from cecum to anus was 10 minutes.     RECOMMENDATIONS         1. The patient will be provided with a written summary of today's endoscopic findings.        2. Follow up colonoscopy in 5 years.         Arturo Peralta MD, Gastroenterologist  Cc: Dr. Tay         MEDICAL HISTORY:      Allergies:  Allergies   No Known Allergies             Past Medical History:   Diagnosis Date    Allergic rhinitis, cause unspecified 01/14/2009    Difficult intubation      DVT (deep venous thrombosis) ([facility]) 02/2018    Esophageal reflux       ALSO HIATAL HERNIA    Hiatal hernia      History of stomach ulcers      Hx of diseases NEC       HX OF ONCHOMYCOSIS    Hx of diseases NEC       TETANUS 6/02    Hx of diseases NEC       COLON 8/06 BENIGN POLYP    Hx of diseases NEC       HX BLADDER DIVERTICULUM    Hx of diseases NEC       HX OF ADHESIVE CAPSULITIS    Hx of diseases NEC       HX OF FINGER FX; S/P LEFT ORIF    Hx of  diseases NEC       HX OF SKIN GRAFT AFTER SULFURIC ACID BURN    Hypertrophy of prostate without urinary obstruction and other lower urinary tract symptoms (LUTS)      Malignant neoplasm of prostate (HCC) 01/14/2009     S/P robotic radical prostatectomy 11/07    Myasthenia gravis (HCC) 08/28/2010    Neuropathy      GURWINDER (obstructive sleep apnea) 3/5/17-PSG     AHI 23 RDI 28 REM AHI 25 Supine AHI 76 non-supine AHI 1.3 Sao2 Zain 86%    GURWINDER (obstructive sleep apnea) 10/3/17-split     AHI 14 RDI 14 REM AHI 0 Supine AHI 44 non-supine AHI 1 Sao2 Zain 88% /APAP-6-16cwp/Prism    Osteoarthrosis, unspecified whether generalized or localized, unspecified site      Other and unspecified hyperlipidemia      Prediabetes       patient states he is pre-diabetic    Vitiligo 05/27/2010            Past Surgical History:   Procedure Laterality Date    COLONOSCOPY         2007    COLONOSCOPY N/A 9/15/2016     Procedure: COLONOSCOPY;  Surgeon: Arturo Peralta MD;  Location:  ENDOSCOPY    COLONOSCOPY & POLYPECTOMY   9/15/16     two polyps were removed, diverticulosis; [facility]ot[facility]    FLUOR GID & LOCLZJ NDL/CATH SPI DX/THER NJX   9/26/2013     Procedure: LUMBAR EPIDURAL;  Surgeon: Jimbo Bardales MD;  Location: [facility]    FLUOR GID & LOCLZJ NDL/CATH SPI DX/THER NJX   10/24/2013     Procedure: LUMBAR EPIDURAL;  Surgeon: Alfredito Cook MD;  Location: [facility]    FLUOR GID & LOCLZJ NDL/CATH SPI DX/THER NJX   11/21/2013     Procedure: LUMBAR EPIDURAL;  Surgeon: Alfredito Cook MD;  Location: [facility]    HERNIA SURGERY   7/9/12 Cdh Dr Williamson     left inguinal and umbilical hernia repairs    HERNIA SURGERY   01/21/2019     Ventral w/mesh    INJECTION, W/WO CONTRAST, DX/THERAPEUTIC SUBSTANCE, EPIDURAL/SUBARACHNOID; LUMBAR/SACRAL   9/26/2013     Procedure: LUMBAR / TRANSFORAMINAL EPIDURAL STEROID INJECTION;  Surgeon: Jimbo Bardales MD;  Location: [facility]    INJECTION, W/WO CONTRAST,  DX/THERAPEUTIC SUBSTANCE, EPIDURAL/SUBARACHNOID; LUMBAR/SACRAL   10/24/2013     Procedure: LUMBAR / TRANSFORAMINAL EPIDURAL STEROID INJECTION;  Surgeon: Alfredito Cook MD;  Location: [facility]    INJECTION, W/WO CONTRAST, DX/THERAPEUTIC SUBSTANCE, EPIDURAL/SUBARACHNOID; LUMBAR/SACRAL   11/21/2013     Procedure: LUMBAR / TRANSFORAMINAL EPIDURAL STEROID INJECTION;  Surgeon: Alfredito Cook MD;  Location: [facility]    LAPAROSCOPY, SURGICAL PROSTATECTOMY, RETROPUBIC RADICAL, W/NERVE SPARING        [facility]   10/24/2013     Procedure: LUMBAR EPIDURAL;  Surgeon: Alfredito Cook MD;  Location: [facility]    [facility]   11/21/2013     Procedure: LUMBAR EPIDURAL;  Surgeon: Alfredito Cook MD;  Location: [facility]    OTHER SURGICAL HISTORY         s/p robotic radical prostatectomy 11/07 per Dr. Bates    OTHER SURGICAL HISTORY         artificial sphincter    OTHER SURGICAL HISTORY   01/2018     perforated stomach - emergency surgery    REMOVAL OF SPERM DUCT(S)        SKIN SUB GRAFT TRNK/ARM/LEG         r calf      Patient Active Problem List:     Myasthenia gravis (HCC)     Pure hypercholesterolemia     S/P prostatectomy     Type 2 diabetes mellitus with diabetic polyneuropathy, without long-term current use of insulin (HCC)     Primary osteoarthritis of right knee     History of prostate cancer     Seasonal allergic rhinitis due to pollen     Fatigue     GURWINDER (obstructive sleep apnea)     Aortic atherosclerosis (HCC)     Left ventricular diastolic dysfunction with preserved systolic function     History of DVT of lower extremity     Spinal stenosis of lumbar region without neurogenic claudication     Bilateral leg edema     History of gastric ulcer     Traumatic complete tear of right rotator cuff, subsequent encounter     Full thickness tear of right subscapularis tendon     MCI (mild cognitive impairment) with  memory loss     Benign tumor of cauda equina (HCC)     Gait abnormality     Immunodeficiency, unspecified (HCC)     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 90 tablet 3    MYCOPHENOLATE MOFETIL 500 MG Oral Tab TAKE TWO TABLETS BY MOUTH TWICE DAILY 360 tablet 0    PYRIDOSTIGMINE 60 MG Oral Tab TAKE ONE TABLET BY MOUTH THREE TIMES DAILY 270 tablet 0    ipratropium 0.06 % Nasal Solution 2 sprays by Nasal route nightly as needed for Rhinitis. 3 each 3    Ciclopirox 8 % External Solution Apply to toenails daily 1 each 11    Glucose Blood (ACCU-CHEK ISAEL PLUS) In Vitro Strip Test blood sugar once daily 100 strip 3    Alpha-Lipoic Acid 200 MG Oral Cap Take by mouth.        Accu-Chek Softclix Lancets Does not apply Misc TEST BLOOD SUGAR ONCE PER  each 3    donepezil 10 MG Oral Tab Take 1 tablet (10 mg total) by mouth nightly. 90 tablet 3    Cholecalciferol 25 MCG (1000 UT) Oral Tab Take 1 tablet by mouth.        acetaminophen 325 MG Oral Tab Take 650 mg by mouth 2 (two) times a day.        Multiple Vitamins-Minerals (CENTRUM SILVER) Oral Tab Take 1 tablet by mouth daily.          @ALL@   Social and Family History:  Social History    Tobacco Use      Smoking status: Never      Smokeless tobacco: Never    Vaping Use      Vaping status: Never Used    Alcohol use: Not Currently      Alcohol/week: 0.0 - 1.0 standard drinks of alcohol      Comment: social rare    Drug use: No           Family History   Problem Relation Age of Onset    Cancer Father           Melanoma    Other (Other) Father           BPH    Other (Other) Mother           MULTIPLE SCLEROSIS    Other  (Other) Brother           KIDNEY STONES         REVIEW OF SYSTEMS:   GENERAL: feels well otherwise  SKIN: denies any unusual skin lesions  EYES: denies any new visual changes or double vision  HEENT: denies any new hearing changes, nasal congestion, sinus pain  LUNGS: denies shortness of breath with exertion  CARDIOVASCULAR: denies chest pain on exertion  GI: as above  : denies dysuria, nocturia or changes in stream  MUSCULOSKELETAL: denies new myalgias, swelling  NEURO: denies new sensory or motor deficits.  PSYCHE: denies depression or anxiety  HEMATOLOGIC: denies bleeding or bruising  ENDOCRINE: denies inc in thirst or heat/cold intolerance  ALL/ASTHMA: denies hx of allergy or asthma     EXAM:   Ht 6' 6\" (1.981 m)   Wt 197 lb (89.4 kg)   BMI 22.77 kg/m²   GENERAL: well developed, well nourished,in no apparent distress  SKIN: no rashes,no suspicious lesions  HEENT: atraumatic, normocephalic, oropharynx clear  NECK: supple,no adenopathy, no masses  GI: no masses or ascites, normal liver span/no organomegaly  RECTAL: deferred  EXTREMITIES: no cyanosis, clubbing or edema  MUSCULOSKELETAL: no joint deformity, swelling or erythema  PSYCH: normal affect  NUT: well nourished appearing, no cachexia     LAB STUDIES:            Lab Results   Component Value Date/Time     WBC 8.22 05/18/2023 09:28 AM     HGB 15.2 05/18/2023 09:28 AM     HCT 48.7 05/18/2023 09:28 AM      05/18/2023 09:28 AM     CREATSERUM 0.71 12/18/2023 09:45 AM     BUN 14.0 12/18/2023 09:45 AM      12/18/2023 09:45 AM     K 4.0 12/18/2023 09:45 AM      12/18/2023 09:45 AM     CO2 26.5 12/18/2023 09:45 AM      (H) 12/18/2023 09:45 AM     CA 8.9 12/18/2023 09:45 AM     ALB 3.9 12/18/2023 09:45 AM     ALKPHO 62 12/18/2023 09:45 AM     BILT 0.36 12/18/2023 09:45 AM     TP 6.6 12/18/2023 09:45 AM     AST 19 12/18/2023 09:45 AM     ALT 15 12/18/2023 09:45 AM     INR 1.04 02/08/2018 10:44 PM     PTP 13.6 02/08/2018 10:44 PM     T4F  1.24 05/21/2020 08:17 AM     TSH 3.843 05/21/2020 08:17 AM     MG 2.2 01/22/2019 05:40 AM     PHOS 2.9 01/22/2019 05:40 AM     CK 60.0 04/27/2015 10:10 AM     B12 723 12/28/2020 02:47 PM     PGLU 127 (H) 06/22/2020 09:00 AM         RADIOLOGICAL DATA:      Non contributory      ASSESSMENT      Personal history of colonic polyps  Last colonoscopy completed with Dr. Peralta 10/2019 and found to have diverticulosis otherwise was unremarkable. Recommended to repeat colonoscopy in 5 years due to personal history of colon polyps. Due now for colonoscopy to rule out polyps, masses or other GI etiologies.       PLAN:      Colonoscopy with MAC and Dr. Peralta. PEG prep. The procedure and risk of procedure was discussed with the patient in detail including but not limited to bleeding, perforation and medication reaction, missed lesions, complication leading up to prolonged hospital surgery, aspiration.  Alternatives and options were discussed, as well as rationale. Patient verbalizes understanding.  All questions answered.

## 2024-09-19 NOTE — BRIEF OP NOTE
Pre-Operative Diagnosis: PERSONAL HISTORY OF COLONIC POLYPS     Post-Operative Diagnosis: POLYPS, DIVERTICULOSIS      Procedure Performed:   COLONOSCOPY WITH FORCEP POLYPECTOMY    Surgeons and Role:     * Arturo Peralta MD - Primary    Assistant(s):        Surgical Findings: see above     Specimen: see op note     Estimated Blood Loss: No data recorded    Dictation Number:  none    Arturo Peralta MD  9/19/2024  9:56 AM

## 2024-09-19 NOTE — ANESTHESIA POSTPROCEDURE EVALUATION
[facility]    Zackary Thurston Patient Status:  Hospital Outpatient Surgery   Age/Gender 74 year old male MRN [id number]   Location [facility] Attending Arturo Peralta MD   Hosp Day # 0 PCP Willian Tay MD       Anesthesia Post-op Note    COLONOSCOPY WITH FORCEP POLYPECTOMY    Procedure Summary       Date: 09/19/24 Room / Location:  ENDOSCOPY 02 / [facility] END[facility]COPY    Anesthesia Start: 0930 Anesthesia Stop: 0957    Procedure: COLONOSCOPY WITH FORCEP POLYPECTOMY Diagnosis: (POLYPS, DIVERTICULOSIS)    Surgeons: Arturo Peralta MD Anesthesiologist: Jimbo Ocampo MD    Anesthesia Type: Not recorded ASA Status: Not recorded            Anesthesia Type: No value filed.    Vitals Value Taken Time   BP 89/ 57 09/19/24 0958   Temp  09/19/24 0958   Pulse 61 09/19/24 0958   Resp 12 09/19/24 0958   SpO2 97 09/19/24 0958       Patient Location: Endoscopy    Anesthesia Type: MAC    Airway Patency: patent    Postop Pain Control: adequate    Mental Status: mildly sedated but able to meaningfully participate in the post-anesthesia evaluation    Nausea/Vomiting: none    Cardiopulmonary/Hydration status: stable euvolemic    Complications: no apparent anesthesia related complications    Postop vital signs: stable    Comments: Report given to RN    Dental Exam: Unchanged from Preop    Patient to be discharged home when criteria met.

## 2024-09-19 NOTE — ANESTHESIA PREPROCEDURE EVALUATION
PRE-OP EVALUATION    Patient Name: Zackary Thurston    Admit Diagnosis: PERSONAL HISTORY OF COLONIC POLYPS    Pre-op Diagnosis: PERSONAL HISTORY OF COLONIC POLYPS    COLONOSCOPY WITH FORCEP POLYPECTOMY    Anesthesia Procedure: COLONOSCOPY WITH FORCEP POLYPECTOMY    Surgeons and Role:     * Arturo Peralta MD - Primary    Pre-op vitals reviewed.  Temp: 99.4 °F (37.4 °C)  Pulse: 79  Resp: 16  BP: 133/68  SpO2: 99 %  Body mass index is 22.65 kg/m².    Current medications reviewed.  Hospital Medications:   lactated ringers infusion   Intravenous Continuous    lactated ringers infusion   Intravenous Continuous    naloxone (Narcan) 0.4 MG/ML injection 0.08 mg  0.08 mg Intravenous Once PRN       Outpatient Medications:     Medications Prior to Admission   Medication Sig Dispense Refill Last Dose    Alpha-Lipoic Acid 200 MG Oral Tab Take 100 mg by mouth daily.   9/18/2024    MYCOPHENOLATE MOFETIL 500 MG Oral Tab Take 2 tablets (1,000 mg total) by mouth 2 (two) times daily. 360 tablet 3 9/18/2024    PYRIDOSTIGMINE 60 MG Oral Tab TAKE ONE TABLET BY MOUTH THREE TIMES DAILY 270 tablet 3 9/18/2024    donepezil 10 MG Oral Tab Take 1 tablet (10 mg total) by mouth nightly. 90 tablet 3 9/18/2024    PRAVASTATIN SODIUM 40 MG Oral Tab TAKE 1 TABLET BY MOUTH IN  THE EVENING 90 tablet 3 9/18/2024    IPRATROPIUM BROMIDE 0.06 % Nasal Solution SPRAY 2 SPRAYS BY NASAL ROUTE NIGHTLY AS NEEDED FOR RHINITIS 15 mL 11 9/18/2024    Fluticasone Propionate 50 MCG/ACT Nasal Suspension 2 sprays by Nasal route daily. 48 g 11 9/18/2024    Cholecalciferol 25 MCG (1000 UT) Oral Tab Take 1 tablet (1,000 Units total) by mouth.   9/18/2024    acetaminophen 325 MG Oral Tab Take 1,000 mg by mouth in the morning and 1,000 mg before bedtime.   9/18/2024    Multiple Vitamins-Minerals (CENTRUM SILVER) Oral Tab Take 1 tablet by mouth daily.   9/18/2024    Glucose Blood (ACCU-CHEK ISAEL PLUS) In Vitro Strip TEST BLOOD SUGAR ONCE DAILY 100 strip 5     Accu-Chek  Softclix Lancets Does not apply Misc           Allergies: Patient has no known allergies.      Anesthesia Evaluation    Patient summary reviewed.    Anesthetic Complications  (-) history of anesthetic complications         GI/Hepatic/Renal      (+) GERD                           Cardiovascular                         (-) past MI                              Endo/Other      (+) diabetes                            Pulmonary                    (+) sleep apnea       Neuro/Psych          (-) CVA       (+) neuromuscular disease                     Past Surgical History:   Procedure Laterality Date    Colonoscopy      2007    Colonoscopy N/A 9/15/2016    Procedure: COLONOSCOPY;  Surgeon: Arturo Peralta MD;  Location:  ENDOSCOPY    Colonoscopy & polypectomy  9/15/16    two polyps were removed, diverticulosis; Edward    Fluor gid & loclzj ndl/cath spi dx/ther njx  9/26/2013    Procedure: LUMBAR EPIDURAL;  Surgeon: Jimbo Bardales MD;  Location: [facility]    Fluor gid & loclzj ndl/cath spi dx/ther njx  10/24/2013    Procedure: LUMBAR EPIDURAL;  Surgeon: Alfredito Cook MD;  Location: [facility]    Fluor gid & loclzj ndl/cath spi dx/ther njx  11/21/2013    Procedure: LUMBAR EPIDURAL;  Surgeon: Alfredito Cook MD;  Location: [facility]    Hernia surgery  7/9/12 Cdh Dr Williamson    left inguinal and umbilical hernia repairs    Hernia surgery  01/21/2019    Ventral w/mesh    Injection, w/wo contrast, dx/therapeutic substance, epidural/subarachnoid; lumbar/sacral  9/26/2013    Procedure: LUMBAR / TRANSFORAMINAL EPIDURAL STEROID INJECTION;  Surgeon: Jimbo Bardales MD;  Location: [facility]    Injection, w/wo contrast, dx/therapeutic substance, epidural/subarachnoid; lumbar/sacral  10/24/2013    Procedure: LUMBAR / TRANSFORAMINAL EPIDURAL STEROID INJECTION;  Surgeon: Alfredito Cook MD;  Location: [facility]    Injection, w/wo contrast, dx/therapeutic  substance, epidural/subarachnoid; lumbar/sacral  11/21/2013    Procedure: LUMBAR / TRANSFORAMINAL EPIDURAL STEROID INJECTION;  Surgeon: Alfredito Cook MD;  Location: [facility]    Laparoscopy, surgical prostatectomy, retropubic radical, w/nerve sparing      M-sedaj by [facility] 5+ yr  10/24/2013    Procedure: LUMBAR EPIDURAL;  Surgeon: Alfredito Cook MD;  Location: [facility]    M-sedaj by [facility] 5+ yr  11/21/2013    Procedure: LUMBAR EPIDURAL;  Surgeon: Alfredito Cook MD;  Location: [facility]    Other surgical history      s/p robotic radical prostatectomy 11/07 per Dr. Bates    Other surgical history      artificial sphincter    Other surgical history  01/2018    perforated stomach - emergency surgery    Removal of sperm duct(s)      Skin sub graft trnk/arm/leg      r calf     Social History     Socioeconomic History    Marital status:     Number of children: 2   Occupational History    Occupation: PROFESSOR   Tobacco Use    Smoking status: Never    Smokeless tobacco: Never   Vaping Use    Vaping status: Never Used   Substance and Sexual Activity    Alcohol use: Yes     Alcohol/week: 0.0 - 1.0 standard drinks of alcohol     Comment: RARE    Drug use: No    Sexual activity: Yes     Partners: Female   Other Topics Concern     Service No    Blood Transfusions No    Caffeine Concern No    Occupational Exposure No    Hobby Hazards No    Sleep Concern No    Weight Concern Yes    Special Diet Yes    Exercise Yes    Bike Helmet Yes    Seat Belt Yes    Self-Exams Yes     History   Drug Use No     Available pre-op labs reviewed.               Airway      Mallampati: II  Mouth opening: >3 FB    Neck ROM: full Cardiovascular    Cardiovascular exam normal.         Dental             Pulmonary    Pulmonary exam normal.                 Other findings              ASA: 2   Plan: MAC  NPO status verified and           Plan/risks discussed with:  patient                Present on Admission:  **None**

## 2025-03-25 ENCOUNTER — ANESTHESIA EVENT (OUTPATIENT)
Dept: SURGERY | Age: 75
End: 2025-03-25

## 2025-03-25 RX ORDER — CALCIUM CARB/VITAMIN D3/VIT K1 500-500-40
200 TABLET,CHEWABLE ORAL DAILY
COMMUNITY

## 2025-03-25 RX ORDER — MYCOPHENOLATE MOFETIL 500 MG/1
1000 TABLET ORAL 2 TIMES DAILY
COMMUNITY
Start: 2024-12-03

## 2025-03-25 RX ORDER — FLUTICASONE PROPIONATE 50 MCG
2 SPRAY, SUSPENSION (ML) NASAL DAILY
COMMUNITY
Start: 2025-02-18

## 2025-03-25 RX ORDER — IPRATROPIUM BROMIDE 42 UG/1
2 SPRAY, METERED NASAL EVERY EVENING
COMMUNITY

## 2025-03-25 RX ORDER — PYRIDOSTIGMINE BROMIDE 60 MG/1
60 TABLET ORAL 3 TIMES DAILY
COMMUNITY
Start: 2024-12-03

## 2025-03-25 RX ORDER — DONEPEZIL HYDROCHLORIDE 10 MG/1
1 TABLET, FILM COATED ORAL DAILY
COMMUNITY
Start: 2024-10-23

## 2025-03-25 RX ORDER — OLMESARTAN MEDOXOMIL 5 MG/1
5 TABLET ORAL DAILY
COMMUNITY

## 2025-03-25 RX ORDER — PRAVASTATIN SODIUM 40 MG
1 TABLET ORAL DAILY
COMMUNITY

## 2025-03-25 ASSESSMENT — ACTIVITIES OF DAILY LIVING (ADL)
RECENT_DECLINE_ADL: NO
ADL_BEFORE_ADMISSION: INDEPENDENT
SENSORY_SUPPORT_DEVICES: HEARING AIDS;EYEGLASSES
NEEDS_ASSIST: NO
ADL_SCORE: 12
ADL_SHORT_OF_BREATH: NO

## 2025-03-26 ENCOUNTER — HOSPITAL ENCOUNTER (OUTPATIENT)
Age: 75
Discharge: HOME | End: 2025-03-26
Attending: UROLOGY | Admitting: UROLOGY

## 2025-03-26 ENCOUNTER — ANESTHESIA (OUTPATIENT)
Dept: SURGERY | Age: 75
End: 2025-03-26

## 2025-03-26 DIAGNOSIS — N36.42 INTRINSIC SPHINCTER DEFICIENCY (ISD): Primary | ICD-10-CM

## 2025-03-26 LAB
ASR DISCLAIMER: NORMAL
CASE RPRT: NORMAL
CLINICAL INFO: NORMAL
PATH REPORT.FINAL DX SPEC: NORMAL
PATH REPORT.GROSS SPEC: NORMAL

## 2025-03-26 PROCEDURE — 13000001 HB PHASE II RECOVERY EA 30 MINUTES: Performed by: UROLOGY

## 2025-03-26 PROCEDURE — 10002800 HB RX 250 W HCPCS: Performed by: UROLOGY

## 2025-03-26 PROCEDURE — 10004452 HB PACU ADDL 30 MINUTES: Performed by: UROLOGY

## 2025-03-26 PROCEDURE — 10002801 HB RX 250 W/O HCPCS: Performed by: UROLOGY

## 2025-03-26 PROCEDURE — 10006027 HB SUPPLY 278: Performed by: UROLOGY

## 2025-03-26 PROCEDURE — 10002801 HB RX 250 W/O HCPCS

## 2025-03-26 PROCEDURE — 13000036 HB COMPLEX  CASE S/U + 1ST 15 MIN: Performed by: UROLOGY

## 2025-03-26 PROCEDURE — 13000002 HB ANESTHESIA  GENERAL   S/U + 1ST 15 MIN: Performed by: UROLOGY

## 2025-03-26 PROCEDURE — C1815 PROS, URINARY SPH, IMP: HCPCS | Performed by: UROLOGY

## 2025-03-26 PROCEDURE — 88300 SURGICAL PATH GROSS: CPT | Performed by: UROLOGY

## 2025-03-26 PROCEDURE — 13000037 HB COMPLEX CASE EACH ADD MINUTE: Performed by: UROLOGY

## 2025-03-26 PROCEDURE — 13000003 HB ANESTHESIA  GENERAL EA ADD MINUTE: Performed by: UROLOGY

## 2025-03-26 PROCEDURE — 10002800 HB RX 250 W HCPCS

## 2025-03-26 PROCEDURE — 10002803 HB RX 637: Performed by: UROLOGY

## 2025-03-26 PROCEDURE — 10004451 HB PACU RECOVERY 1ST 30 MINUTES: Performed by: UROLOGY

## 2025-03-26 PROCEDURE — 10006023 HB SUPPLY 272: Performed by: UROLOGY

## 2025-03-26 PROCEDURE — 10002807 HB RX 258

## 2025-03-26 DEVICE — IMPLANTABLE DEVICE: Type: IMPLANTABLE DEVICE | Site: BLADDER | Status: FUNCTIONAL

## 2025-03-26 DEVICE — ACCESSORY KIT QUICK CONNECT WINDOW CONNECTORS (3 STRAIGHT, 2 RIGHT ANGLE, 1 Y), SUTURE-TIE CONNECTORS (3 STRAIGHT, 2 RIGHT ANGLE, 1 Y), 8 COLLETS, 1 COLLET HOLDER, 1 CUFF SIZER, 2 22-GAUGE BLUNT TIP NEEDLES, 2 15-GAUGE BLUNT TIP NEEDLES, 2 30 CM LENGTHS OF TUBING
Type: IMPLANTABLE DEVICE | Site: BLADDER | Status: FUNCTIONAL
Brand: AMS 800 ARTIFICIAL URINARY SPHINCTER

## 2025-03-26 RX ORDER — LIDOCAINE HYDROCHLORIDE 20 MG/ML
INJECTION, SOLUTION INFILTRATION; PERINEURAL PRN
Status: DISCONTINUED | OUTPATIENT
Start: 2025-03-26 | End: 2025-03-26

## 2025-03-26 RX ORDER — PROPOFOL 10 MG/ML
INJECTION, EMULSION INTRAVENOUS PRN
Status: DISCONTINUED | OUTPATIENT
Start: 2025-03-26 | End: 2025-03-26

## 2025-03-26 RX ORDER — HYDROCODONE BITARTRATE AND ACETAMINOPHEN 5; 325 MG/1; MG/1
1 TABLET ORAL ONCE
Status: COMPLETED | OUTPATIENT
Start: 2025-03-26 | End: 2025-03-26

## 2025-03-26 RX ORDER — MIDAZOLAM HYDROCHLORIDE 1 MG/ML
INJECTION, SOLUTION INTRAMUSCULAR; INTRAVENOUS PRN
Status: DISCONTINUED | OUTPATIENT
Start: 2025-03-26 | End: 2025-03-26

## 2025-03-26 RX ORDER — DEXAMETHASONE SODIUM PHOSPHATE 4 MG/ML
INJECTION, SOLUTION INTRA-ARTICULAR; INTRALESIONAL; INTRAMUSCULAR; INTRAVENOUS; SOFT TISSUE PRN
Status: DISCONTINUED | OUTPATIENT
Start: 2025-03-26 | End: 2025-03-26

## 2025-03-26 RX ORDER — NICOTINE POLACRILEX 4 MG
30 LOZENGE BUCCAL
Status: DISCONTINUED | OUTPATIENT
Start: 2025-03-26 | End: 2025-03-26 | Stop reason: HOSPADM

## 2025-03-26 RX ORDER — GENTAMICIN SULFATE 60 MG/50ML
400 INJECTION, SOLUTION INTRAVENOUS ONCE
Status: DISCONTINUED | OUTPATIENT
Start: 2025-03-26 | End: 2025-03-26 | Stop reason: HOSPADM

## 2025-03-26 RX ORDER — DROPERIDOL 2.5 MG/ML
0.62 INJECTION, SOLUTION INTRAMUSCULAR; INTRAVENOUS
Status: DISCONTINUED | OUTPATIENT
Start: 2025-03-26 | End: 2025-03-26 | Stop reason: HOSPADM

## 2025-03-26 RX ORDER — DEXTROSE MONOHYDRATE 25 G/50ML
25 INJECTION, SOLUTION INTRAVENOUS PRN
Status: DISCONTINUED | OUTPATIENT
Start: 2025-03-26 | End: 2025-03-26 | Stop reason: HOSPADM

## 2025-03-26 RX ORDER — HYDRALAZINE HYDROCHLORIDE 20 MG/ML
5 INJECTION INTRAMUSCULAR; INTRAVENOUS EVERY 10 MIN PRN
Status: DISCONTINUED | OUTPATIENT
Start: 2025-03-26 | End: 2025-03-26 | Stop reason: HOSPADM

## 2025-03-26 RX ORDER — SODIUM CHLORIDE, SODIUM LACTATE, POTASSIUM CHLORIDE, CALCIUM CHLORIDE 600; 310; 30; 20 MG/100ML; MG/100ML; MG/100ML; MG/100ML
INJECTION, SOLUTION INTRAVENOUS CONTINUOUS PRN
Status: DISCONTINUED | OUTPATIENT
Start: 2025-03-26 | End: 2025-03-26

## 2025-03-26 RX ORDER — GENTAMICIN SULFATE 60 MG/50ML
INJECTION, SOLUTION INTRAVENOUS PRN
Status: DISCONTINUED | OUTPATIENT
Start: 2025-03-26 | End: 2025-03-26

## 2025-03-26 RX ORDER — HYDROCODONE BITARTRATE AND ACETAMINOPHEN 5; 325 MG/1; MG/1
1 TABLET ORAL EVERY 6 HOURS PRN
Qty: 10 TABLET | Refills: 0 | Status: SHIPPED | OUTPATIENT
Start: 2025-03-26

## 2025-03-26 RX ORDER — ONDANSETRON 2 MG/ML
INJECTION INTRAMUSCULAR; INTRAVENOUS PRN
Status: DISCONTINUED | OUTPATIENT
Start: 2025-03-26 | End: 2025-03-26

## 2025-03-26 RX ORDER — ROCURONIUM BROMIDE 10 MG/ML
INJECTION, SOLUTION INTRAVENOUS PRN
Status: DISCONTINUED | OUTPATIENT
Start: 2025-03-26 | End: 2025-03-26

## 2025-03-26 RX ORDER — NEOSTIGMINE METHYLSULFATE 4 MG/4 ML
SYRINGE (ML) INTRAVENOUS PRN
Status: DISCONTINUED | OUTPATIENT
Start: 2025-03-26 | End: 2025-03-26

## 2025-03-26 RX ORDER — BUPIVACAINE HYDROCHLORIDE 5 MG/ML
INJECTION, SOLUTION EPIDURAL; INTRACAUDAL; PERINEURAL PRN
Status: DISCONTINUED | OUTPATIENT
Start: 2025-03-26 | End: 2025-03-26 | Stop reason: HOSPADM

## 2025-03-26 RX ORDER — METOCLOPRAMIDE HYDROCHLORIDE 5 MG/ML
5 INJECTION INTRAMUSCULAR; INTRAVENOUS
Status: DISCONTINUED | OUTPATIENT
Start: 2025-03-26 | End: 2025-03-26 | Stop reason: HOSPADM

## 2025-03-26 RX ORDER — GLYCOPYRROLATE 0.2 MG/ML
INJECTION, SOLUTION INTRAMUSCULAR; INTRAVENOUS PRN
Status: DISCONTINUED | OUTPATIENT
Start: 2025-03-26 | End: 2025-03-26

## 2025-03-26 RX ORDER — ONDANSETRON 2 MG/ML
4 INJECTION INTRAMUSCULAR; INTRAVENOUS
Status: DISCONTINUED | OUTPATIENT
Start: 2025-03-26 | End: 2025-03-26 | Stop reason: HOSPADM

## 2025-03-26 RX ORDER — SULFAMETHOXAZOLE AND TRIMETHOPRIM 800; 160 MG/1; MG/1
1 TABLET ORAL 2 TIMES DAILY
Qty: 14 TABLET | Refills: 0 | Status: SHIPPED | OUTPATIENT
Start: 2025-03-26 | End: 2025-04-02

## 2025-03-26 RX ADMIN — SODIUM CHLORIDE, POTASSIUM CHLORIDE, SODIUM LACTATE AND CALCIUM CHLORIDE: 600; 310; 30; 20 INJECTION, SOLUTION INTRAVENOUS at 07:26

## 2025-03-26 RX ADMIN — FENTANYL CITRATE 50 MCG: 50 INJECTION INTRAMUSCULAR; INTRAVENOUS at 07:46

## 2025-03-26 RX ADMIN — ROCURONIUM BROMIDE 10 MG: 10 INJECTION INTRAVENOUS at 09:00

## 2025-03-26 RX ADMIN — WATER 2000 MG: 1 INJECTION INTRAMUSCULAR; INTRAVENOUS; SUBCUTANEOUS at 08:03

## 2025-03-26 RX ADMIN — MIDAZOLAM HYDROCHLORIDE 2 MG: 1 INJECTION, SOLUTION INTRAMUSCULAR; INTRAVENOUS at 07:46

## 2025-03-26 RX ADMIN — ROCURONIUM BROMIDE 20 MG: 10 INJECTION INTRAVENOUS at 08:10

## 2025-03-26 RX ADMIN — GLYCOPYRROLATE 0.6 MG: 0.2 INJECTION, SOLUTION INTRAMUSCULAR; INTRAVENOUS at 09:56

## 2025-03-26 RX ADMIN — ONDANSETRON 4 MG: 2 INJECTION INTRAMUSCULAR; INTRAVENOUS at 09:55

## 2025-03-26 RX ADMIN — GENTAMICIN SULFATE 400 MG: 40 INJECTION, SOLUTION INTRAMUSCULAR; INTRAVENOUS at 08:18

## 2025-03-26 RX ADMIN — DEXAMETHASONE SODIUM PHOSPHATE 8 MG: 4 INJECTION INTRA-ARTICULAR; INTRALESIONAL; INTRAMUSCULAR; INTRAVENOUS; SOFT TISSUE at 09:15

## 2025-03-26 RX ADMIN — LIDOCAINE HYDROCHLORIDE 5 ML: 20 INJECTION, SOLUTION INFILTRATION; PERINEURAL at 07:50

## 2025-03-26 RX ADMIN — Medication 150 MG: at 07:50

## 2025-03-26 RX ADMIN — Medication 4 MG: at 09:56

## 2025-03-26 RX ADMIN — SUGAMMADEX 200 MG: 100 INJECTION, SOLUTION INTRAVENOUS at 09:54

## 2025-03-26 RX ADMIN — SODIUM CHLORIDE, POTASSIUM CHLORIDE, SODIUM LACTATE AND CALCIUM CHLORIDE: 600; 310; 30; 20 INJECTION, SOLUTION INTRAVENOUS at 09:25

## 2025-03-26 RX ADMIN — PROPOFOL 180 MG: 10 INJECTION, EMULSION INTRAVENOUS at 07:50

## 2025-03-26 RX ADMIN — HYDROCODONE BITARTRATE AND ACETAMINOPHEN 1 TABLET: 5; 325 TABLET ORAL at 12:09

## 2025-03-26 RX ADMIN — FENTANYL CITRATE 50 MCG: 50 INJECTION INTRAMUSCULAR; INTRAVENOUS at 08:58

## 2025-03-26 RX ADMIN — ROCURONIUM BROMIDE 5 MG: 10 INJECTION INTRAVENOUS at 07:50

## 2025-03-26 SDOH — SOCIAL STABILITY: SOCIAL INSECURITY: RISK FACTORS: AGE

## 2025-03-26 SDOH — SOCIAL STABILITY: SOCIAL INSECURITY: RISK FACTORS: VASCULAR DISEASE

## 2025-03-26 SDOH — SOCIAL STABILITY: SOCIAL INSECURITY: RISK FACTORS: NEUROLOGICAL DISEASE

## 2025-03-26 ASSESSMENT — PAIN SCALES - GENERAL
PAINLEVEL_OUTOF10: 1
PAINLEVEL_OUTOF10: 2
PAINLEVEL_OUTOF10: 0
PAINLEVEL_OUTOF10: 2
PAINLEVEL_OUTOF10: 3
PAINLEVEL_OUTOF10: 2
PAINLEVEL_OUTOF10: 0
PAINLEVEL_OUTOF10: 2
PAINLEVEL_OUTOF10: 0

## 2025-03-27 VITALS
HEIGHT: 78 IN | HEART RATE: 63 BPM | BODY MASS INDEX: 22.65 KG/M2 | OXYGEN SATURATION: 99 % | WEIGHT: 195.77 LBS | DIASTOLIC BLOOD PRESSURE: 73 MMHG | SYSTOLIC BLOOD PRESSURE: 126 MMHG | TEMPERATURE: 97.5 F | RESPIRATION RATE: 20 BRPM

## (undated) DEVICE — CHLORAPREP 26ML APPLICATOR

## (undated) DEVICE — Device

## (undated) DEVICE — DECANTER FLUID DSPNSR BAG BAG-A-JET ASEPTIC TRNSF

## (undated) DEVICE — MONOPOLAR CURVED SCISSORS: Brand: ENDOWRIST

## (undated) DEVICE — POUCH INST 11X7IN 2 ADH STRIP 2 CMPRT STRL STRDRP PLASTIC

## (undated) DEVICE — SUTURE COATED VICRYL PLUS 3-0 SH L27 IN BRAID ANBCTRL COATED

## (undated) DEVICE — OCCLUSIVE CUFF WITH INHIBIZONE
Type: IMPLANTABLE DEVICE | Site: BLADDER | Status: NON-FUNCTIONAL
Brand: AMS 800 ARTIFICIAL URINARY SPHINCTER

## (undated) DEVICE — SYSTEM WND IRR IRRISEPT DBRD CLNSG 0.05% CHG STRL LF

## (undated) DEVICE — CATHETER LUBRI-SIL 18FR 5CC FOLEY 2 WAY BLN SIL HYDROGEL

## (undated) DEVICE — SUTURE ETHILON 2-0 FS

## (undated) DEVICE — STERILE POLYISOPRENE POWDER-FREE SURGICAL GLOVES: Brand: PROTEXIS

## (undated) DEVICE — DRAIN RELIAVAC 100CC

## (undated) DEVICE — GLOVE SURG 7.5 PROTEXIS BLUE PI PWDR FREE SMTH BEAD CUFF INTLK

## (undated) DEVICE — SUTURE VICRYL 3-0 SH

## (undated) DEVICE — KIT VLV 5 PC AIR H2O SUCT BX ENDOGATOR CONN

## (undated) DEVICE — SYRINGE 20 ML GRAD LOK MED

## (undated) DEVICE — KIT MFLD FOR SPEC COLL

## (undated) DEVICE — GOWN,SIRUS,FABRIC-REINFORCED,X-LARGE: Brand: MEDLINE

## (undated) DEVICE — GOWN SURG XL L3 NONREINFORCE SET IN SLV STRL LF DISP BLUE

## (undated) DEVICE — VISUALIZATION SYSTEM: Brand: CLEARIFY

## (undated) DEVICE — SPONGE STICK WITH PVP-I: Brand: KENDALL

## (undated) DEVICE — SUPPORTER ATHL LG LG SPNS SPRT

## (undated) DEVICE — SOLUTION IRR 0.9% NA CL 1000 ML PLASTIC POUR BTL ISOTONIC

## (undated) DEVICE — LUBRICANT INST 2 OZ BCTRST FLIP TOP CAP SRGLB

## (undated) DEVICE — PROXIMATE SKIN STAPLERS (35 WIDE) CONTAINS 35 STAINLESS STEEL STAPLES (FIXED HEAD): Brand: PROXIMATE

## (undated) DEVICE — DRAPE SURG ADH STRIP SM TOWEL MATTE FNSH L17 IN X W11 IN

## (undated) DEVICE — SPONGE GAUZE L6.75 IN X W6 IN ABS CTN

## (undated) DEVICE — COVER WAND RF DETECT

## (undated) DEVICE — SOLUTION IV 0.9% NA CL PVC 1000 ML PH 5 PLASTIC CNTNR

## (undated) DEVICE — STAPLER SKIN L3.9 MM X W6.9 MM WIDE RECT 35 COUNT ROTATE

## (undated) DEVICE — SUTURE PDS II 1 ST-21

## (undated) DEVICE — SUTURE MONOCRYL 4-0 PS-2 L18 IN MONO UNDYED ABS

## (undated) DEVICE — GLOVE SURG 8 PROTEXIS PI ORTHO PWDR FREE SMTH BEAD CUFF

## (undated) DEVICE — 3M™ RED DOT™ MONITORING ELECTRODE WITH FOAM TAPE AND STICKY GEL, 50/BAG, 20/CASE, 72/PLT 2570: Brand: RED DOT™

## (undated) DEVICE — GIJAW SINGLE-USE BIOPSY FORCEPS WITH NEEDLE: Brand: GIJAW

## (undated) DEVICE — 10FT COMBINED O2 DELIVERY/CO2 MONITORING. FILTER WITH MICROSTREAM TYPE LUER: Brand: DUAL ADULT NASAL CANNULA

## (undated) DEVICE — GLOVE SURG 6.5 PROTEXIS LF BLUE PF SMTH BEAD CUFF INTLK STRL

## (undated) DEVICE — SYRINGE 10 ML CNTRL CONC TIP PYROGEN FREE DEHP FREE LOK MED

## (undated) DEVICE — NEEDLE HPO 25GA 1.5IN REG WALL REG BVL LL HUB DEHP-FR STRL

## (undated) DEVICE — DRESSING 4X8 STERILE

## (undated) DEVICE — SUTURE VICRYL 3-0

## (undated) DEVICE — VIOLET BRAIDED (POLYGLACTIN 910), SYNTHETIC ABSORBABLE SUTURE: Brand: COATED VICRYL

## (undated) DEVICE — COLUMN DRAPE

## (undated) DEVICE — UNDYED BRAIDED (POLYGLACTIN 910), SYNTHETIC ABSORBABLE SUTURE: Brand: COATED VICRYL

## (undated) DEVICE — BLADELESS OBTURATOR: Brand: WECK VISTA

## (undated) DEVICE — 40580 - THE PINK PAD - ADVANCED TRENDELENBURG POSITIONING KIT: Brand: 40580 - THE PINK PAD - ADVANCED TRENDELENBURG POSITIONING KIT

## (undated) DEVICE — GLOVE SURG 7 PROTEXIS PI CLASSIC PWDR FREE BEAD CUFF PLISPRN

## (undated) DEVICE — DRAIN CHANNEL 19FR BLAKE

## (undated) DEVICE — CANNULA SEAL

## (undated) DEVICE — MINI LAP PACK-LF: Brand: MEDLINE INDUSTRIES, INC.

## (undated) DEVICE — KENDALL SCD EXPRESS SLEEVES, KNEE LENGTH, MEDIUM: Brand: KENDALL SCD

## (undated) DEVICE — GLOVE SURG 7.5 PROTEXIS LF CRM PF SMTH BEAD CUFF STRL

## (undated) DEVICE — LOOP VESSEL MINI YLW DEVON

## (undated) DEVICE — HANDPIECE SCT MDVC FLX-CLR HI CAPACITY FLXB CLR STRL LF DISP

## (undated) DEVICE — ENDOPATH ULTRA VERESS INSUFFLATION NEEDLES WITH LUER LOCK CONNECTORS: Brand: ENDOPATH

## (undated) DEVICE — FORCEP CUSH BIP BAY 1.5MX7.5IN

## (undated) DEVICE — SOL  .9 1000ML BTL

## (undated) DEVICE — SYRINGE 30 ML CONC TIP GRAD NONPYROGENIC DEHP FREE LOK MED

## (undated) DEVICE — 2% CHLORHEXIDINE SKIN PREP ORANGE 26ML

## (undated) DEVICE — KIT CUSTOM ENDOPROCEDURE STERIS

## (undated) DEVICE — PREMIUM WET SKIN PREP TRAY: Brand: MEDLINE INDUSTRIES, INC.

## (undated) DEVICE — DRAPE .75 SHT FNFLD 76X52IN SURG CNVRT STRL LF DISP TIBURON

## (undated) DEVICE — SUTURE VLOC 180 0 12\" 0316

## (undated) DEVICE — MEGA SUTURECUT ND: Brand: ENDOWRIST

## (undated) DEVICE — TOWEL OR BLU 16X26IN 4PK

## (undated) DEVICE — ELECTRODE PT RTN L9 FT VALLEYLAB REM POLYHESIVE ACRYLIC FOAM

## (undated) DEVICE — SET IRR L81 IN 10 GTTML STRGT NA DEHP BLDR REGULATE CLAMP

## (undated) DEVICE — SUTURE PROLENE BLUE 4-0 PS-2 L18 IN MONO NABSB

## (undated) DEVICE — SUTURE VLOC 90 2-0 9\" 2145

## (undated) DEVICE — DRAPE PLVC FLUID POUCH ROBOTIC LAPSCP LITH 142X124X30IN SURG

## (undated) DEVICE — LAPAROTOMY CDS: Brand: MEDLINE INDUSTRIES, INC.

## (undated) DEVICE — TIP COVER ACCESSORY

## (undated) DEVICE — 3M(TM) MICROPORE TAPE DISPENSER 1535-2: Brand: 3M™ MICROPORE™

## (undated) DEVICE — DISSECTOR SURG 38IN SECTO PNUT SPNG

## (undated) DEVICE — HOOK RTRCT LONE STAR 20 MM X 16 MM SM CRV ELASTIC STAY BLUNT

## (undated) DEVICE — ARM DRAPE

## (undated) DEVICE — CATHETER LUBRI-SIL 12FR 5CC FOLEY 2 WAY BLN SIL HYDROGEL

## (undated) DEVICE — 1200CC GUARDIAN II: Brand: GUARDIAN

## (undated) DEVICE — REM POLYHESIVE ADULT PATIENT RETURN ELECTRODE: Brand: VALLEYLAB

## (undated) DEVICE — RING RETRACTOR AMS 700 SKW DEEP SCRT

## (undated) DEVICE — TUBING SCT CLR 12FT 316IN MDVC MAXI-GRIP NCDTV MALE TO MALE

## (undated) DEVICE — GLOVE SURG 6.5 PROTEXIS LF CRM PF BEAD CUFF STRL PLISPRN

## (undated) DEVICE — GOWN SURG LG FABRIC ASTOUND BLUE LVL 3 NONREINFORCE SET IN

## (undated) DEVICE — SUTURE COAT VICRYL 2-0 SH L27 IN BRAID COAT UNDYED ABS

## (undated) DEVICE — COVER STAND W23 IN REINFORCE PLASTIC

## (undated) DEVICE — DRAPE UNDER BUTT FLTR SCRN FL CNTRL POUCH DRN PORT GRAD

## (undated) DEVICE — DRESSING AQUACEL AG 3.5X12

## (undated) DEVICE — DRAPE 2 INCS FILM ANTIMICROBIAL 23X17IN SURG IOBAN STRL

## (undated) DEVICE — DERMABOND LIQUID ADHESIVE

## (undated) DEVICE — SUTURE VICRYL 2-0

## (undated) DEVICE — DRESSING TRANS 4.75X4IN ADH HPOAL WTPRF TEGADERM PU STD STRL

## (undated) DEVICE — SYRINGE 10 ML GRAD NONPYROGENIC DEHP FREE PVC FREE LOK MED

## (undated) DEVICE — PROGRASP FORCEPS: Brand: ENDOWRIST

## (undated) DEVICE — TRAY CATH SURESTEP ADD-A-FOLEY CMP CR STLK FOLEY URMTR STAB

## (undated) DEVICE — ROBOTIC GENERAL: Brand: MEDLINE INDUSTRIES, INC.

## (undated) DEVICE — SUPPORT SCRT LG REG 2.75IN 38-44IN WAISTBAND OPEN KNIT POUCH

## (undated) DEVICE — ADHESIVE SKIN CLSR DERMABOND ADVANCED .7 ML LIQUID APL

## (undated) NOTE — LETTER
[facility]  Marky Nayageoff 59 [id number] [facility], [address] [place] [address]    Consent for Operation    Date: __________________    Time: _______________    1.  I authorize the performance upon Shelley Farrar the following operation:    Procedure(s):  REPAIR OF A FASCIA videotape. [facility] will not be responsible for storage or maintenance of this tape. 6. For the purpose of advancing medical education, I consent to the admittance of observers to the Operating Room.     7. I authorize the use of any specimen, organs Signature of Patient:   ___________________________    When the patient is a minor or mentally incompetent to give consent:  Signature of person authorized to consent for patient: ___________________________   Relationship to patient: _____________________ drugs/illegal medications). Failure to inform my anesthesiologist about these medicines may increase my risk of anesthetic complications. · If I am allergic to anything or have had a reaction to anesthesia before.     3. I understand how the anesthesia med I have discussed the procedure and information above with the patient (or patient’s representative) and answered their questions. The patient or their representative has agreed to have anesthesia services.     _______________________________________________